# Patient Record
Sex: FEMALE | Race: WHITE | NOT HISPANIC OR LATINO | Employment: OTHER | URBAN - METROPOLITAN AREA
[De-identification: names, ages, dates, MRNs, and addresses within clinical notes are randomized per-mention and may not be internally consistent; named-entity substitution may affect disease eponyms.]

---

## 2023-03-08 ENCOUNTER — HOSPITAL ENCOUNTER (EMERGENCY)
Facility: HOSPITAL | Age: 73
Discharge: HOME | End: 2023-03-08
Attending: EMERGENCY MEDICINE
Payer: MEDICARE

## 2023-03-08 VITALS
OXYGEN SATURATION: 98 % | WEIGHT: 250 LBS | DIASTOLIC BLOOD PRESSURE: 81 MMHG | RESPIRATION RATE: 18 BRPM | HEIGHT: 63 IN | TEMPERATURE: 99 F | HEART RATE: 66 BPM | BODY MASS INDEX: 44.3 KG/M2 | SYSTOLIC BLOOD PRESSURE: 196 MMHG

## 2023-03-08 DIAGNOSIS — R10.32 LEFT LOWER QUADRANT ABDOMINAL PAIN: Primary | ICD-10-CM

## 2023-03-08 LAB
ALBUMIN SERPL BCP-MCNC: 3.8 G/DL (ref 3.5–5.2)
ALP SERPL-CCNC: 101 U/L (ref 55–135)
ALT SERPL W/O P-5'-P-CCNC: 22 U/L (ref 10–44)
ANION GAP SERPL CALC-SCNC: 10 MMOL/L (ref 8–16)
AST SERPL-CCNC: 25 U/L (ref 10–40)
BASOPHILS # BLD AUTO: 0.07 K/UL (ref 0–0.2)
BASOPHILS NFR BLD: 0.9 % (ref 0–1.9)
BILIRUB SERPL-MCNC: 0.5 MG/DL (ref 0.1–1)
BILIRUB UR QL STRIP: NEGATIVE
BUN SERPL-MCNC: 16 MG/DL (ref 8–23)
CALCIUM SERPL-MCNC: 9.8 MG/DL (ref 8.7–10.5)
CHLORIDE SERPL-SCNC: 102 MMOL/L (ref 95–110)
CLARITY UR REFRACT.AUTO: CLEAR
CO2 SERPL-SCNC: 25 MMOL/L (ref 23–29)
COLOR UR AUTO: YELLOW
CREAT SERPL-MCNC: 0.8 MG/DL (ref 0.5–1.4)
DIFFERENTIAL METHOD: ABNORMAL
EOSINOPHIL # BLD AUTO: 0.4 K/UL (ref 0–0.5)
EOSINOPHIL NFR BLD: 5.7 % (ref 0–8)
ERYTHROCYTE [DISTWIDTH] IN BLOOD BY AUTOMATED COUNT: 13 % (ref 11.5–14.5)
EST. GFR  (NO RACE VARIABLE): >60 ML/MIN/1.73 M^2
GLUCOSE SERPL-MCNC: 103 MG/DL (ref 70–110)
GLUCOSE UR QL STRIP: NEGATIVE
HCT VFR BLD AUTO: 44.4 % (ref 37–48.5)
HGB BLD-MCNC: 14 G/DL (ref 12–16)
HGB UR QL STRIP: NEGATIVE
IMM GRANULOCYTES # BLD AUTO: 0.01 K/UL (ref 0–0.04)
IMM GRANULOCYTES NFR BLD AUTO: 0.1 % (ref 0–0.5)
KETONES UR QL STRIP: NEGATIVE
LEUKOCYTE ESTERASE UR QL STRIP: NEGATIVE
LIPASE SERPL-CCNC: 10 U/L (ref 4–60)
LYMPHOCYTES # BLD AUTO: 2.1 K/UL (ref 1–4.8)
LYMPHOCYTES NFR BLD: 27.6 % (ref 18–48)
MCH RBC QN AUTO: 29 PG (ref 27–31)
MCHC RBC AUTO-ENTMCNC: 31.5 G/DL (ref 32–36)
MCV RBC AUTO: 92 FL (ref 82–98)
MONOCYTES # BLD AUTO: 0.7 K/UL (ref 0.3–1)
MONOCYTES NFR BLD: 9.1 % (ref 4–15)
NEUTROPHILS # BLD AUTO: 4.3 K/UL (ref 1.8–7.7)
NEUTROPHILS NFR BLD: 56.6 % (ref 38–73)
NITRITE UR QL STRIP: NEGATIVE
NRBC BLD-RTO: 0 /100 WBC
PH UR STRIP: 7 [PH] (ref 5–8)
PLATELET # BLD AUTO: 250 K/UL (ref 150–450)
PMV BLD AUTO: 9.3 FL (ref 9.2–12.9)
POTASSIUM SERPL-SCNC: 4.7 MMOL/L (ref 3.5–5.1)
PROT SERPL-MCNC: 7.4 G/DL (ref 6–8.4)
PROT UR QL STRIP: ABNORMAL
RBC # BLD AUTO: 4.82 M/UL (ref 4–5.4)
SODIUM SERPL-SCNC: 137 MMOL/L (ref 136–145)
SP GR UR STRIP: 1.02 (ref 1–1.03)
URN SPEC COLLECT METH UR: ABNORMAL
WBC # BLD AUTO: 7.57 K/UL (ref 3.9–12.7)

## 2023-03-08 PROCEDURE — 63600175 PHARM REV CODE 636 W HCPCS: Performed by: EMERGENCY MEDICINE

## 2023-03-08 PROCEDURE — 99285 EMERGENCY DEPT VISIT HI MDM: CPT | Mod: 25

## 2023-03-08 PROCEDURE — 99284 PR EMERGENCY DEPT VISIT,LEVEL IV: ICD-10-PCS | Mod: ,,, | Performed by: EMERGENCY MEDICINE

## 2023-03-08 PROCEDURE — 83690 ASSAY OF LIPASE: CPT | Performed by: EMERGENCY MEDICINE

## 2023-03-08 PROCEDURE — 25500020 PHARM REV CODE 255: Performed by: EMERGENCY MEDICINE

## 2023-03-08 PROCEDURE — 96374 THER/PROPH/DIAG INJ IV PUSH: CPT | Mod: 59

## 2023-03-08 PROCEDURE — 85025 COMPLETE CBC W/AUTO DIFF WBC: CPT | Performed by: EMERGENCY MEDICINE

## 2023-03-08 PROCEDURE — 81003 URINALYSIS AUTO W/O SCOPE: CPT | Performed by: EMERGENCY MEDICINE

## 2023-03-08 PROCEDURE — 99284 EMERGENCY DEPT VISIT MOD MDM: CPT | Mod: ,,, | Performed by: EMERGENCY MEDICINE

## 2023-03-08 PROCEDURE — 80053 COMPREHEN METABOLIC PANEL: CPT | Performed by: EMERGENCY MEDICINE

## 2023-03-08 RX ORDER — MORPHINE SULFATE 4 MG/ML
4 INJECTION, SOLUTION INTRAMUSCULAR; INTRAVENOUS
Status: COMPLETED | OUTPATIENT
Start: 2023-03-08 | End: 2023-03-08

## 2023-03-08 RX ADMIN — MORPHINE SULFATE 4 MG: 4 INJECTION INTRAVENOUS at 12:03

## 2023-03-08 RX ADMIN — IOHEXOL 100 ML: 350 INJECTION, SOLUTION INTRAVENOUS at 01:03

## 2023-03-08 NOTE — DISCHARGE INSTRUCTIONS
Your CT scan did not show an acute cause of your pain.  There were some enlarged lymph nodes in the middle of your stomach which could be from a recent infection.  It would be helpful if they can compare your CT scan from today from previous abdominal imaging.  return to emergency department if symptoms get worse.

## 2023-03-08 NOTE — ED PROVIDER NOTES
Encounter Date: 3/8/2023    SCRIBE #1 NOTE: I, Francy Chacon, am scribing for, and in the presence of,  Brando Oro MD. I have scribed the following portions of the note - Other sections scribed: HPI, ROS, PE.     History     Chief Complaint   Patient presents with    Abdominal Pain     Constant LLQ since 9pm last night without N/V/D/fevers. Pain pill with gabapentin this am- dulls pain slightly.      Time patient was seen by the provider: 11:19 AM      The patient is a 72 y.o. female with past medical history of cancer, depression (on Celexa), and neuropathy (on Gabapentin) who presents to the ED with a complaint of LLQ abdominal pain onset 9 pm last night. The patient reports that she spontaneously developed abdominal pain that she describes as a throbbing sensation that radiates to her left back. She took Hydrocodone and Gabapentin with improvement. The patient had symptoms like this before in the past, but notes that her symptoms resolved after placing a heating pad over the area. She used a heating pad yesterday with no relief. Her surgical history consist of , appendectomy, and tubal ligation. No history of diverticulitis. She denies fever, cough, SOB, chest pain, nausea, vomiting, diarrhea, dysuria, and vaginal bleeding. A ten point review of systems was completed and is negative except as documented above.  Patient denies any other acute medical complaint. The patients available PMH, PSH, Social History, medications, allergies, and triage vital signs were reviewed just prior to their medical evaluation.      The history is provided by the patient and medical records. No  was used.   Review of patient's allergies indicates:   Allergen Reactions    Penicillins      Past Medical History:   Diagnosis Date    Cancer      Past Surgical History:   Procedure Laterality Date    APPENDECTOMY       SECTION      JOINT REPLACEMENT      TUBAL LIGATION       No family history on  file.  Social History     Tobacco Use    Smoking status: Never    Smokeless tobacco: Never   Substance Use Topics    Alcohol use: Never     Review of Systems   Constitutional:  Negative for fever.   Respiratory:  Negative for cough and shortness of breath.    Cardiovascular:  Negative for chest pain.   Gastrointestinal:  Positive for abdominal pain (LLQ). Negative for diarrhea, nausea and vomiting.   Genitourinary:  Negative for dysuria and vaginal bleeding.     Physical Exam     Initial Vitals [03/08/23 1106]   BP Pulse Resp Temp SpO2   132/74 89 18 98.7 °F (37.1 °C) 95 %      MAP       --         Physical Exam    Nursing note and vitals reviewed.  Constitutional: She appears well-developed and well-nourished. She is not diaphoretic. No distress.   HENT:   Head: Normocephalic and atraumatic.   Nose: Nose normal.   Eyes: Conjunctivae are normal. Right eye exhibits no discharge. Left eye exhibits no discharge.   Neck: Neck supple.   Normal range of motion.  Cardiovascular:  Normal rate, regular rhythm and normal heart sounds.     Exam reveals no gallop and no friction rub.       No murmur heard.  Pulmonary/Chest: Breath sounds normal. No respiratory distress. She has no wheezes. She has no rhonchi. She has no rales.   Abdominal: Abdomen is soft. She exhibits no distension. There is abdominal tenderness.   Mild LLQ tenderness to palpation. There is no rebound and no guarding.   Musculoskeletal:         General: No tenderness or edema. Normal range of motion.      Cervical back: Normal range of motion and neck supple.     Neurological: She is alert and oriented to person, place, and time. GCS score is 15. GCS eye subscore is 4. GCS verbal subscore is 5. GCS motor subscore is 6.   Skin: Skin is warm and dry. No rash noted. No erythema.   Psychiatric: She has a normal mood and affect. Her behavior is normal. Judgment and thought content normal.       ED Course   Procedures  Labs Reviewed   CBC W/ AUTO DIFFERENTIAL -  Abnormal; Notable for the following components:       Result Value    MCHC 31.5 (*)     All other components within normal limits   URINALYSIS, REFLEX TO URINE CULTURE - Abnormal; Notable for the following components:    Protein, UA Trace (*)     All other components within normal limits    Narrative:     Specimen Source->Urine   COMPREHENSIVE METABOLIC PANEL   LIPASE          Imaging Results              US Pelvis Comp with Transvag NON-OB (xpd) (Final result)  Result time 23 17:03:16   Procedure changed from US Pelvis Limited Non OB     Final result by Jake Eaton MD (23 17:03:16)                   Impression:      No significant abnormality.    Electronically signed by resident: Hector Oneal  Date:    2023  Time:    16:52    Electronically signed by: Jake Eaton  Date:    2023  Time:    17:03               Narrative:    EXAMINATION:  US PELVIS COMP WITH TRANSVAG NON-OB (XPD)    CLINICAL HISTORY:  left adnexal pain;    TECHNIQUE:  Transabdominal sonography of the pelvis was performed, followed by transvaginal sonography to better evaluate the uterus and ovaries.    COMPARISON:  CT abdomen 2023    FINDINGS:  Uterus:    Size: 5.3 x 4.5 x 2.5 cm    Masses: Scarring in the anterior lower uterine segment from prior  section.    Endometrium: Normal in this post menopausal patient, measuring 1 mm.  Trace fluid in the endometrial cavity, likely secondary to cervical stenosis.    Right ovary: Not visualized.    Left ovary:    Size: 1.3 x 1.1 x 1.7 cm    Appearance: Normal    Vascular Flow: Normal.    Free Fluid:    None.                                        CT Abdomen Pelvis With Contrast (Final result)  Result time 23 14:31:08      Final result by Thierno Toledo MD (23 14:31:08)                   Impression:      1. No acute process or CT findings identified to explain patient's symptoms of left lower quadrant pain.  Specifically, there is minimal colonic  diverticulosis without diverticulitis, and no inflammatory process seen in the left lower quadrant or pelvis.  2. Mild hepatomegaly.  3. Cholelithiasis without acute cholecystitis.  4. Small hiatal hernia.  5. Central mesentery and twila hepatis nonspecific lymphadenopathy.  Differential considerations include sequela of recent or remote infectious or inflammatory process including occult enteritis versus density including lymphoma.  Clinical correlation and with any prior imaging from outside facility if/when available to assess for chronicity, is recommended.  Further evaluation/follow-up as warranted.  6. Atherosclerosis.  7. Few additional findings as above.  This report was flagged in Epic as abnormal.      Electronically signed by: Thierno Toledo MD  Date:    03/08/2023  Time:    14:31               Narrative:    EXAMINATION:  CT ABDOMEN PELVIS WITH CONTRAST    CLINICAL HISTORY:  LLQ abdominal pain;    TECHNIQUE:  Low dose axial images, sagittal and coronal reformations were obtained from the lung bases to the pubic symphysis following the IV administration of 100 mL of Omnipaque 350 .  Oral contrast was not given.    COMPARISON:  Chest radiograph 01/05/2017 and 03/12/2012    FINDINGS:  Imaged lung bases show minimal dependent atelectasis and minimal scattered platelike scarring versus atelectasis.  Base of the heart is upper limits of normal in size without significant pericardial fluid noting prominent mitral annular calcifications.    Liver measures 18.6 cm in length without focal process seen.    Cholelithiasis without acute cholecystitis.  Pancreas is mildly atrophic without focal process or adjacent inflammatory change.  Spleen, stomach, duodenum and bilateral adrenal glands are within normal limits.  No significant biliary ductal dilatation.    Small hiatal hernia.    Bilateral kidneys are normal in size, shape and location with symmetric normal enhancement.  No hydronephrosis or significant perinephric  stranding.  Ureters are nondilated.  Urinary bladder is within normal limits.  Uterus and bilateral adnexa are within normal limits.  Few scattered subcentimeter pelvic phleboliths noted.  No significant free fluid in the pelvis.    Appendix not identified; however, no pericecal inflammatory change.  Terminal ileum is within normal limits.  Moderate amount of stool noted throughout the colon and rectum.  Few scattered colonic diverticula without diverticulitis.  No evidence of bowel obstruction or acute inflammation.  No pneumatosis or portal venous gas.    No ascites or free air.    Several prominent and also mildly enlarged lymph nodes within the midline and left central mesentery of the mid abdomen, largest measuring up to 1.5 cm in maximum short axis.  Few prominent and also mildly enlarged twila hepatis lymph nodes measuring up to 1.1 cm in maximum short axis.  Several scattered subcentimeter retroperitoneal lymph nodes.  No retroperitoneal, pelvic or inguinal lymphadenopathy by CT criteria.    Minimal to mild scattered calcific atherosclerosis of the abdominal aorta extending into its mesenteric and iliac branches.  Abdominal aorta is slightly tortuous without aneurysm or dissection.    Small fat containing umbilical hernia.    Osseous structures show generalized osteopenia and age-related degenerative change most prominent at L2-3 and L4-5 levels without acute or destructive process seen.                                       Medications   morphine injection 4 mg (4 mg Intravenous Given 3/8/23 1210)   iohexoL (OMNIPAQUE 350) injection 100 mL (100 mLs Intravenous Given 3/8/23 1354)     Medical Decision Making:   History:   Old Medical Records: I decided to obtain old medical records.  Clinical Tests:   Lab Tests: Ordered and Reviewed  Radiological Study: Ordered and Reviewed  ED Management:  72-year-old female presents with left lower quadrant and left inguinal pain.  Vitals with hypertension.  Physical exam  as above.  Labs unremarkable.  CT without evidence of acute surgical pathology.  Ordered ultrasound.  Ultrasound pending at turnover.  Discussed with Dr. Branham who will f/up and dispo.  Did bedside teaching.  All questions answered.  Patient acknowledges understanding.         Scribe Attestation:   Scribe #1: I performed the above scribed service and the documentation accurately describes the services I performed. I attest to the accuracy of the note.                   Clinical Impression:   Final diagnoses:  [R10.32] Left lower quadrant abdominal pain (Primary)        ED Disposition Condition    Discharge Stable          ED Prescriptions    None       Follow-up Information       Follow up With Specialties Details Why Contact Info        Please follow-up with your PCP.             Brando Oro MD  03/10/23 0968

## 2023-03-08 NOTE — PROVIDER PROGRESS NOTES - EMERGENCY DEPT.
Encounter Date: 3/8/2023    ED Physician Progress Notes        Physician Note:   5:00 Pm  Patient received in sign-out from Dr. Welsh.  Briefly, patient is a 72-year-old female presenting today with left lower quadrant abdominal pain that started last night.  Vital signs are stable  Labs including UA were negative  CT abdomen pelvis showed no acute process, did show lymphadenopathy in the central abdomen without obvious source of infection.  Thought it could be secondary to resolving infection.    At time of sign-out, pending pelvic ultrasound    5:20 PM  Ultrasound negative for acute process.    Discussed results with patient at bedside.  It would be helpful if she could have her primary care doctor reviewed the images of the CT scan today compare with previous imaging regarding the lymphadenopathy.  She may need further workup as an outpatient I do not think this is the cause of her pain today.  I have given her strict return precautions if symptoms get worse, fever, vomiting or focal pain.    JAZMIN Branham MD  Staff ED Physician  03/08/2023 5:24 PM

## 2023-03-08 NOTE — ED TRIAGE NOTES
Patient identifiers for Elizabeth Au 72 y.o. female checked and correct.  Chief Complaint   Patient presents with    Abdominal Pain     Constant LLQ since 9pm last night without N/V/D/fevers. Pain pill with gabapentin this am- dulls pain slightly.      No past medical history on file.  Allergies reported: Review of patient's allergies indicates:  Not on File      LOC: Patient is awake, alert, and aware of environment with an appropriate affect. Patient is oriented x 4 and speaking appropriately.  APPEARANCE: Patient resting comfortably and in no acute distress. Patient is clean and well groomed, patient's clothing is properly fastened.  SKIN: The skin is warm and dry. Patient has normal skin turgor and moist mucus membranes.   MUSKULOSKELETAL: Patient is moving all extremities well, no obvious deformities noted. Pulses intact.   RESPIRATORY: Airway is open and patent. Respirations are spontaneous and non-labored with normal effort and rate.  CARDIAC: Patient has a normal rate and rhythm on cardiac monitor. No peripheral edema noted.   ABDOMEN: No distention noted. Soft and non-tender upon palpation. Right lower pain  NEUROLOGICAL:  PERRL. Facial expression is symmetrical. Hand grasps are equal bilaterally. Normal sensation in all extremities when touched with finger.

## 2023-09-08 ENCOUNTER — TELEPHONE (OUTPATIENT)
Dept: SURGERY | Facility: CLINIC | Age: 73
End: 2023-09-08
Payer: MEDICARE

## 2023-09-12 ENCOUNTER — TELEPHONE (OUTPATIENT)
Dept: BARIATRICS | Facility: CLINIC | Age: 73
End: 2023-09-12
Payer: MEDICARE

## 2023-09-12 NOTE — TELEPHONE ENCOUNTER
Patient on work queue list following financial appt.  Reviewed insurance and department guidelines- pt meets BMI requirement.  Dashboard updated with financial information.  Left clinic phone number and asked her to call us so we can assist in getting her appts. Scheduled.     PALOMO Vega RN

## 2023-09-19 ENCOUNTER — TELEPHONE (OUTPATIENT)
Dept: BARIATRICS | Facility: CLINIC | Age: 73
End: 2023-09-19
Payer: MEDICARE

## 2023-09-19 NOTE — TELEPHONE ENCOUNTER
Phoned pt in regard to scheduling consults for surgical wl. Gave pt detail instructions on hospital location.

## 2023-09-19 NOTE — TELEPHONE ENCOUNTER
Returned patient's call.  [facility] with contact info.     PALOMO Vega RN    ----- Message from Cyndie Vega RN sent at 9/18/2023  5:14 PM CDT -----  Contact: [phone]    ----- Message -----  From: Jin Baez  Sent: 9/13/2023   2:59 PM CDT  To: Cyndie Vega RN    Pt stating she is wanting to make an appt. Requesting a f/u call.

## 2023-09-19 NOTE — TELEPHONE ENCOUNTER
----- Message from Tomasz Olea sent at 9/19/2023 10:25 AM CDT -----  Regarding: Missed call  Contact: [phone]  Hi, pt missed a call from the office today and is asking for a call back at [phone] to help pt get scheduled.

## 2023-09-20 ENCOUNTER — HOSPITAL ENCOUNTER (OUTPATIENT)
Dept: CARDIOLOGY | Facility: CLINIC | Age: 73
Discharge: HOME | End: 2023-09-20
Payer: MEDICARE

## 2023-09-20 ENCOUNTER — HOSPITAL ENCOUNTER (OUTPATIENT)
Dept: RADIOLOGY | Facility: HOSPITAL | Age: 73
Discharge: HOME | End: 2023-09-20
Attending: NURSE PRACTITIONER
Payer: MEDICARE

## 2023-09-20 ENCOUNTER — CLINICAL SUPPORT (OUTPATIENT)
Dept: BARIATRICS | Facility: CLINIC | Age: 73
End: 2023-09-20
Payer: MEDICARE

## 2023-09-20 ENCOUNTER — OFFICE VISIT (OUTPATIENT)
Dept: BARIATRICS | Facility: CLINIC | Age: 73
End: 2023-09-20
Payer: MEDICARE

## 2023-09-20 VITALS — BODY MASS INDEX: 46.52 KG/M2 | WEIGHT: 262.56 LBS | HEIGHT: 63 IN

## 2023-09-20 VITALS
DIASTOLIC BLOOD PRESSURE: 65 MMHG | BODY MASS INDEX: 46.45 KG/M2 | WEIGHT: 262.13 LBS | OXYGEN SATURATION: 98 % | HEIGHT: 63 IN | HEART RATE: 75 BPM | SYSTOLIC BLOOD PRESSURE: 124 MMHG

## 2023-09-20 DIAGNOSIS — G62.0 CHEMOTHERAPY-INDUCED NEUROPATHY: ICD-10-CM

## 2023-09-20 DIAGNOSIS — I89.0 LYMPHEDEMA: ICD-10-CM

## 2023-09-20 DIAGNOSIS — E66.01 CLASS 3 SEVERE OBESITY WITH BODY MASS INDEX (BMI) OF 45.0 TO 49.9 IN ADULT, UNSPECIFIED OBESITY TYPE, UNSPECIFIED WHETHER SERIOUS COMORBIDITY PRESENT: Primary | ICD-10-CM

## 2023-09-20 DIAGNOSIS — E66.01 CLASS 3 SEVERE OBESITY WITH BODY MASS INDEX (BMI) OF 45.0 TO 49.9 IN ADULT, UNSPECIFIED OBESITY TYPE, UNSPECIFIED WHETHER SERIOUS COMORBIDITY PRESENT: ICD-10-CM

## 2023-09-20 DIAGNOSIS — M17.0 OSTEOARTHRITIS OF BOTH KNEES, UNSPECIFIED OSTEOARTHRITIS TYPE: ICD-10-CM

## 2023-09-20 DIAGNOSIS — J45.909 ASTHMA, UNSPECIFIED ASTHMA SEVERITY, UNSPECIFIED WHETHER COMPLICATED, UNSPECIFIED WHETHER PERSISTENT: ICD-10-CM

## 2023-09-20 DIAGNOSIS — T45.1X5A CHEMOTHERAPY-INDUCED NEUROPATHY: ICD-10-CM

## 2023-09-20 DIAGNOSIS — Z71.3 DIETARY COUNSELING AND SURVEILLANCE: Primary | ICD-10-CM

## 2023-09-20 DIAGNOSIS — E78.49 OTHER HYPERLIPIDEMIA: ICD-10-CM

## 2023-09-20 DIAGNOSIS — K21.9 GASTROESOPHAGEAL REFLUX DISEASE WITHOUT ESOPHAGITIS: ICD-10-CM

## 2023-09-20 DIAGNOSIS — E66.01 MORBID OBESITY WITH BMI OF 45.0-49.9, ADULT: ICD-10-CM

## 2023-09-20 DIAGNOSIS — Z86.39 HISTORY OF OBESITY IN ADULTHOOD: ICD-10-CM

## 2023-09-20 DIAGNOSIS — Z79.899 POLYPHARMACY: ICD-10-CM

## 2023-09-20 DIAGNOSIS — E03.9 HYPOTHYROIDISM, UNSPECIFIED TYPE: ICD-10-CM

## 2023-09-20 PROBLEM — M17.9 OA (OSTEOARTHRITIS) OF KNEE: Status: ACTIVE | Noted: 2023-09-20

## 2023-09-20 PROCEDURE — 99999 PR PBB SHADOW E&M-EST. PATIENT-LVL II: CPT | Mod: PBBFAC,,, | Performed by: DIETITIAN, REGISTERED

## 2023-09-20 PROCEDURE — 99205 OFFICE O/P NEW HI 60 MIN: CPT | Mod: S$GLB,,, | Performed by: NURSE PRACTITIONER

## 2023-09-20 PROCEDURE — 1101F PT FALLS ASSESS-DOCD LE1/YR: CPT | Mod: CPTII,S$GLB,, | Performed by: NURSE PRACTITIONER

## 2023-09-20 PROCEDURE — 93005 EKG 12-LEAD: ICD-10-PCS | Mod: S$GLB,,, | Performed by: NURSE PRACTITIONER

## 2023-09-20 PROCEDURE — 93005 ELECTROCARDIOGRAM TRACING: CPT | Mod: S$GLB,,, | Performed by: NURSE PRACTITIONER

## 2023-09-20 PROCEDURE — 1159F PR MEDICATION LIST DOCUMENTED IN MEDICAL RECORD: ICD-10-PCS | Mod: CPTII,S$GLB,, | Performed by: NURSE PRACTITIONER

## 2023-09-20 PROCEDURE — 1126F AMNT PAIN NOTED NONE PRSNT: CPT | Mod: CPTII,S$GLB,, | Performed by: NURSE PRACTITIONER

## 2023-09-20 PROCEDURE — 1101F PR PT FALLS ASSESS DOC 0-1 FALLS W/OUT INJ PAST YR: ICD-10-PCS | Mod: CPTII,S$GLB,, | Performed by: NURSE PRACTITIONER

## 2023-09-20 PROCEDURE — 3078F DIAST BP <80 MM HG: CPT | Mod: CPTII,S$GLB,, | Performed by: NURSE PRACTITIONER

## 2023-09-20 PROCEDURE — 3008F BODY MASS INDEX DOCD: CPT | Mod: CPTII,S$GLB,, | Performed by: NURSE PRACTITIONER

## 2023-09-20 PROCEDURE — 99205 PR OFFICE/OUTPT VISIT, NEW, LEVL V, 60-74 MIN: ICD-10-PCS | Mod: S$GLB,,, | Performed by: NURSE PRACTITIONER

## 2023-09-20 PROCEDURE — 71046 X-RAY EXAM CHEST 2 VIEWS: CPT | Mod: 26,,, | Performed by: RADIOLOGY

## 2023-09-20 PROCEDURE — 99499 NO LOS: ICD-10-PCS | Mod: S$GLB,,, | Performed by: DIETITIAN, REGISTERED

## 2023-09-20 PROCEDURE — 3288F FALL RISK ASSESSMENT DOCD: CPT | Mod: CPTII,S$GLB,, | Performed by: NURSE PRACTITIONER

## 2023-09-20 PROCEDURE — 99499 UNLISTED E&M SERVICE: CPT | Mod: S$GLB,,, | Performed by: DIETITIAN, REGISTERED

## 2023-09-20 PROCEDURE — 3288F PR FALLS RISK ASSESSMENT DOCUMENTED: ICD-10-PCS | Mod: CPTII,S$GLB,, | Performed by: NURSE PRACTITIONER

## 2023-09-20 PROCEDURE — 99999 PR PBB SHADOW E&M-EST. PATIENT-LVL II: ICD-10-PCS | Mod: PBBFAC,,, | Performed by: DIETITIAN, REGISTERED

## 2023-09-20 PROCEDURE — 3078F PR MOST RECENT DIASTOLIC BLOOD PRESSURE < 80 MM HG: ICD-10-PCS | Mod: CPTII,S$GLB,, | Performed by: NURSE PRACTITIONER

## 2023-09-20 PROCEDURE — 1159F MED LIST DOCD IN RCRD: CPT | Mod: CPTII,S$GLB,, | Performed by: NURSE PRACTITIONER

## 2023-09-20 PROCEDURE — 1126F PR PAIN SEVERITY QUANTIFIED, NO PAIN PRESENT: ICD-10-PCS | Mod: CPTII,S$GLB,, | Performed by: NURSE PRACTITIONER

## 2023-09-20 PROCEDURE — 71046 X-RAY EXAM CHEST 2 VIEWS: CPT | Mod: TC,FY

## 2023-09-20 PROCEDURE — 3074F SYST BP LT 130 MM HG: CPT | Mod: CPTII,S$GLB,, | Performed by: NURSE PRACTITIONER

## 2023-09-20 PROCEDURE — 99999 PR PBB SHADOW E&M-EST. PATIENT-LVL IV: ICD-10-PCS | Mod: PBBFAC,,, | Performed by: NURSE PRACTITIONER

## 2023-09-20 PROCEDURE — 71046 XR CHEST PA AND LATERAL: ICD-10-PCS | Mod: 26,,, | Performed by: RADIOLOGY

## 2023-09-20 PROCEDURE — 3008F PR BODY MASS INDEX (BMI) DOCUMENTED: ICD-10-PCS | Mod: CPTII,S$GLB,, | Performed by: NURSE PRACTITIONER

## 2023-09-20 PROCEDURE — 93010 ELECTROCARDIOGRAM REPORT: CPT | Mod: S$GLB,,, | Performed by: INTERNAL MEDICINE

## 2023-09-20 PROCEDURE — 93010 EKG 12-LEAD: ICD-10-PCS | Mod: S$GLB,,, | Performed by: INTERNAL MEDICINE

## 2023-09-20 PROCEDURE — 99999 PR PBB SHADOW E&M-EST. PATIENT-LVL IV: CPT | Mod: PBBFAC,,, | Performed by: NURSE PRACTITIONER

## 2023-09-20 PROCEDURE — 3074F PR MOST RECENT SYSTOLIC BLOOD PRESSURE < 130 MM HG: ICD-10-PCS | Mod: CPTII,S$GLB,, | Performed by: NURSE PRACTITIONER

## 2023-09-20 RX ORDER — ATORVASTATIN CALCIUM 10 MG/1
10 TABLET, FILM COATED ORAL
COMMUNITY
Start: 2023-09-07

## 2023-09-20 RX ORDER — FLUTICASONE FUROATE, UMECLIDINIUM BROMIDE AND VILANTEROL TRIFENATATE 200; 62.5; 25 UG/1; UG/1; UG/1
1 POWDER RESPIRATORY (INHALATION)
COMMUNITY
Start: 2023-08-31 | End: 2024-01-04 | Stop reason: SDUPTHER

## 2023-09-20 RX ORDER — DEXMETHYLPHENIDATE HYDROCHLORIDE 10 MG/1
10 TABLET ORAL 2 TIMES DAILY
COMMUNITY
Start: 2023-04-12 | End: 2023-10-19

## 2023-09-20 RX ORDER — GABAPENTIN 100 MG/1
CAPSULE ORAL
COMMUNITY
Start: 2023-08-18

## 2023-09-20 RX ORDER — ALBUTEROL SULFATE 90 UG/1
AEROSOL, METERED RESPIRATORY (INHALATION)
COMMUNITY
Start: 2023-01-05

## 2023-09-20 RX ORDER — HYDROXYZINE HYDROCHLORIDE 25 MG/1
25 TABLET, FILM COATED ORAL
COMMUNITY
Start: 2023-07-27 | End: 2023-09-20

## 2023-09-20 RX ORDER — LEVOTHYROXINE SODIUM 125 UG/1
125 TABLET ORAL
COMMUNITY
Start: 2023-08-18

## 2023-09-20 RX ORDER — HYDROCODONE BITARTRATE AND ACETAMINOPHEN 5; 325 MG/1; MG/1
1 TABLET ORAL 2 TIMES DAILY PRN
COMMUNITY
Start: 2023-09-13

## 2023-09-20 RX ORDER — CITALOPRAM 20 MG/1
20 TABLET, FILM COATED ORAL
COMMUNITY
Start: 2023-08-18

## 2023-09-20 RX ORDER — OMEPRAZOLE 20 MG/1
20 CAPSULE, DELAYED RELEASE ORAL DAILY
COMMUNITY
Start: 2023-05-08

## 2023-09-20 NOTE — PATIENT INSTRUCTIONS
Prior to surgery you will need to complete:  - Dietitian consult and follow up appointments as needed  - Labs  - Chest X-ray  - EKG  - Psychological evaluation, Please call psychiatry [phone] to schedule  - Stress test     In preparation for bariatric surgery, please complete the following:   Discuss your current medications with your primary care provider, remember your medications will need to be crushed, chewable, or in liquid form for the first 2-4 weeks after a gastric bypass or sleeve.  For a gastric band, your medications will need to be crushed indefinitely.    If you take a blood thinner such as: Coumadin (warfarin), Pradaxa (dabigatran), or Plavix (clopidogrel), you will need to speak with your prescribing provider on how or if this medication can be stopped before surgery.   If you take a medication for depression or anxiety, remember to discuss this with the psychologist or psychiatrist that you see.   If you take medication for arthritis on a daily basis that is considered a non-steroidal anti-inflammatory (NSAID), please discuss with your prescribing physician an alternative medication.  After having gastric bypass or gastric sleeve, this group of medications is not appropriate to take due to increased risk of bleeding stomach ulcers.      DEFINITIONS  Appointments: Pre-scheduled meetings or consultations with any physician, advanced practice provider, dietitian, or psychologist, and labs, imaging studies, sleep studies, etc.   Late cancellation: Cancelling an appointment 24-48 hours prior to scheduled time.  No-Show appointment:  is when   You do NOT arrive to your appointment at the time its scheduled.  You call to cancel or cancel via [facility] less than 24 hours in advance of your scheduled appointment  You show up 15 minutes AFTER your scheduled appointment time without any notification of being late.     POLICY  You are allowed up to 3 cancellations for appointments.   After 3  cancellations your case will be placed on hold for 2 months and after that time you can resume the program.   You are allowed only 1 no-show for an appointment.   You will be re-scheduled one time and if there is a 2nd no-show at any point, your case will be placed on hold for 3 months.  After 3 months you can resume the program.     Upon resuming the program after being placed on hold for either above mentioned reasons, if you have a late cancel or no show for any appointment, the bariatric team will review if youre an appropriate candidate for surgery at the monthly meeting.

## 2023-09-20 NOTE — PROGRESS NOTES
BARIATRIC NEW PATIENT EVALUATION    CHIEF COMPLAINT:   Morbid obesity, body mass index is 47.17 kg/m². and inability to lose weight.    HPI:  Elizabeth Au is a 73 y.o. morbidly obese female. Her current body mass index is 47.17 kg/m². She has multiple associated comorbidities including hyperlipidemia, GERD, and osteoarthritis.  She has struggled with excess weight since 1980s.  Her highest adult weight was 280 lbs at age 71, and her lowest adult weight was 118 lbs at age 20.  The patient has tried Weight Watchers, calorie counting, Keto diet, and hormone therapy .  The patient was most successful with hormone shots with a weight loss of 30 lbs.  Her current exercise includes  PT   2 times a week. She denies any history of eating disorder such as anorexia, bulimia, or taking laxatives for weight loss, and denies any addiction including illicit substances, alcohol, or gambling.  Patient states she has a good  support system.  She lives with .  She is retired.  She  denies a history of GERD.  The patient's goal is to move better.     ESS: Score of 0, reviewed 09/20/2023.  Does not need Sleep Study.    Pre op weight-262  IBW-132    Lower mid line incision for ex lap that resulted in appendectomy 1980s    CT abd/pelvis 3/8/23:   Impression:   1. No acute process or CT findings identified to explain patient's symptoms of left lower quadrant pain.  Specifically, there is minimal colonic diverticulosis without diverticulitis, and no inflammatory process seen in the left lower quadrant or pelvis.  2. Mild hepatomegaly.  3. Cholelithiasis without acute cholecystitis.  4. Small hiatal hernia.  5. Central mesentery and twila hepatis nonspecific lymphadenopathy.  Differential considerations include sequela of recent or remote infectious or inflammatory process including occult enteritis versus density including lymphoma.  Clinical correlation and with any prior imaging from outside facility if/when available to assess  for chronicity, is recommended.  Further evaluation/follow-up as warranted.  6. Atherosclerosis.      2021 ( [facility] [place]) FL Esophagram IMPRESSION:   1. Normal pharyngeal phase of deglutition.   2. Presbyesophagus.   3. Hiatal hernia with a mildly tight Schatzki B ring but the patient swallowed a barium tablet without difficulty.   4. During the study, the patient demonstrated gastroesophageal reflux up to the level of the upper thoracic esophagus.   5.  chest film demonstrates some nodular soft tissue fullness in the region of the right tracheobronchial angle.       PFTs ([facility] [place]) 3/2022  Conclusions:   Overall normal complete lung function testing, clinical correlation   advised    PAST MEDICAL HISTORY:  Past Medical History:   Diagnosis Date    Cancer        PAST SURGICAL HISTORY:  Past Surgical History:   Procedure Laterality Date    APPENDECTOMY       SECTION      JOINT REPLACEMENT      TUBAL LIGATION         FAMILY HISTORY:  History reviewed. No pertinent family history.     SOCIAL HISTORY:  Social History     Socioeconomic History    Marital status:    Tobacco Use    Smoking status: Never    Smokeless tobacco: Never   Substance and Sexual Activity    Alcohol use: Never       MEDICATIONS:    Current Outpatient Medications:     albuterol (PROVENTIL/VENTOLIN HFA) 90 mcg/actuation inhaler, , Disp: , Rfl:     atorvastatin (LIPITOR) 10 MG tablet, Take 10 mg by mouth., Disp: , Rfl:     citalopram (CELEXA) 20 MG tablet, Take 20 mg by mouth., Disp: , Rfl:     FOCALIN 10 mg tablet, Take 10 mg by mouth 2 (two) times daily., Disp: , Rfl:     gabapentin (NEURONTIN) 100 MG capsule, Take by mouth., Disp: , Rfl:     HYDROcodone-acetaminophen (NORCO) 5-325 mg per tablet, Take 1 tablet by mouth 2 (two) times daily as needed., Disp: , Rfl:     levothyroxine (SYNTHROID) 125 MCG tablet, Take 125 mcg by mouth., Disp: , Rfl:     omeprazole (PRILOSEC) 20 MG capsule, Take 20 mg by mouth., Disp: , Rfl:     TRELEGY ELLIPTA  "200-62.5-25 mcg inhaler, Inhale 1 puff into the lungs., Disp: , Rfl:     ALLERGIES:  Review of patient's allergies indicates:   Allergen Reactions    Penicillins        Review of Systems   Constitutional:  Negative for chills and fever.   HENT:  Negative for ear pain, nosebleeds and sore throat.    Eyes:  Negative for blurred vision and double vision.   Respiratory:  Negative for cough and shortness of breath.    Cardiovascular:  Negative for chest pain, palpitations, orthopnea, claudication and leg swelling.   Gastrointestinal:  Negative for abdominal pain, constipation, diarrhea, heartburn, nausea and vomiting.   Genitourinary:  Negative for dysuria and urgency.   Musculoskeletal:  Negative for back pain and joint pain.   Skin:  Negative for rash.   Neurological:  Negative for dizziness, tingling, focal weakness and headaches.   Endo/Heme/Allergies:  Does not bruise/bleed easily.   Psychiatric/Behavioral:  Negative for depression and suicidal ideas.        Vitals:    09/20/23 1156   BP: 124/65   Pulse: 75   SpO2: 98%   Weight: 118.9 kg (262 lb 1.6 oz)   Height: 5' 2.5" (1.588 m)   PainSc: 0-No pain       Physical Exam  Vitals and nursing note reviewed.   Constitutional:       Appearance: She is well-developed. She is morbidly obese.      Comments: Arrived using a walker   HENT:      Head: Normocephalic.      Nose: Nose normal.      Mouth/Throat:      Mouth: Mucous membranes are moist.   Eyes:      Extraocular Movements: Extraocular movements intact.   Cardiovascular:      Rate and Rhythm: Normal rate and regular rhythm.      Heart sounds: Normal heart sounds.   Pulmonary:      Effort: Pulmonary effort is normal.      Breath sounds: Normal breath sounds.   Abdominal:      General: Bowel sounds are normal.      Palpations: Abdomen is soft.   Musculoskeletal:         General: Normal range of motion.      Cervical back: Normal range of motion.   Skin:     General: Skin is warm and dry.      Capillary Refill: Capillary " refill takes less than 2 seconds.   Neurological:      Mental Status: She is alert and oriented to person, place, and time.   Psychiatric:         Mood and Affect: Mood normal.        DIAGNOSIS:  1. Morbid obesity, body mass index is 47.17 kg/m². and inability to lose weight.  2. Co-morbidities: hyperlipidemia, GERD, and osteoarthritis    PLAN:  The patient is a good candidate for Bariatric Surgery. The patient is interested in laparoscopic sleeve gastrectomy with Dr. Corrales. The surgery and post-op care was discussed in detail with the patient. All questions were answered.    The patient understands that bariatric surgery is a tool to aid in weight loss and that they need to be committed to the diet and exercise post-operatively for successful weight loss. Discussed with patient that bariatric surgery is not the easy way out and that it will take plenty of dedication on the patient's part to be successful. Also discussed the possibility of weight regain if the patient strays from the diet guidelines or exercise requirements. Patient verbalized understanding and wishes to proceed with the work-up.    Estimated Goal weight is 195-200 lbs.    Discussed no NSAIDS post op    ORDERS:  1. Stress Test, Chest X-Ray, and EKG  2. Psychological Consult and Bariatric Dietician Consult  3. Bariatric Labs: Per orders.    PCP: No, Primary Doctor  RTC: As scheduled.    This includes 60 min face to face time and non-face to face time preparing to see the patient (eg, review of tests), obtaining and/or reviewing separately obtained history, documenting clinical information in the electronic or other health record, independently interpreting results and communicating results to the patient/family/caregiver, or care coordinator.

## 2023-09-20 NOTE — PATIENT INSTRUCTIONS
It was good getting to speak with you today.  I am including the following handouts.  Preparing for Bariatric Surgery  Bariatric Grocery List  0695-9041 calories with  gms of protein sample meal plan  Healthy Eating on the Go   Please let me know if you have any questions or concerns.  Prabha magaña,  Neema POLLOCK  Bariatric Dietitian  [phone]  Preparing for Bariatric Surgery: Nutrition    Bariatric surgery is a great tool in helping you lose weight. However, we need your help to make your surgery successful by following the nutrition plans before and after surgery. You will meet with a dietitian who will go over pre and post-surgery nutrition plans and will work with you to change your nutrition lifestyle. After your decision to have bariatric surgery, we ask that you start a high-protein/low- fat & low carbohydrate diet.     Below is a guideline to get you started.   All meals should only include lean meats/proteins and non-starchy vegetables. Fruits can be used as snacks or desserts. Try to avoid high sugar canned fruit (most canned fruit in syrup).   Eliminate empty calorie snacks (cake, candy, chips) and eat high-protein snacks instead (cheese sticks, rolled deli meat, cottage cheese and tomatoes)     Do not include any additional foods to your meal, such as: Breads or starchy vegetables (ex. corn, potatoes, sweet potatoes, green peas) Exercise at least 3 times a week for 30 minutes, it does not have to be 30 minutes all at once!   Switch to smaller plates to help you with portion control.  Begin limiting carbonated beverages   You may begin substituting one meal for a protein shake. Limit Caffeine        All liquids should be sugar-free and low calorie.  No Juices Practice taking small bites and sips. Practice not drinking while eating.     Meal Base Options  Flavor your food with lemon, vinegar, herbs and spices for big flavor without added calories.     Lean Meats/Proteins: How to prepare: bake, broil,  boil, roast, grill, sauté in Estefani or I can't believe it's not butter spray. Remove all visible fat before and after cooking.  NO BREADING or FRYING.    Poultry: skinless chicken or turkey (light/dark), ground (90% lean). Take off skin from poultry.  Fish/Shellfish: catfish, clams, crab, crawfish, lobster, salmon, shrimp, squid, tilapia, trout, tuna  Beef: tenderloin, roast (rib, charli, rump), steak (sirloin, round, cubed, T-bone, flank), ground (90% lean)  Pork: lean ham, Macedonian penaloza, tenderloin, center loin chop  Game: venison, rabbit, duck  Deli meats: turkey, roast beef, ham, chicken, low fat hot dogs  Dairy: low fat or fat free (3gm fat per ounce), sliced or shredded cheese, string cheese, hard cheese, cottage cheese, Greek or low-fat yogurt (aim for less than 10g sugar)  Soy: tofu  Eggs: However you like them!  Beans and Legumes: red, white, black, lima, black-eyed-peas, chickpeas, lentils, edamame  Nuts and Seeds: unsalted, ¼ cup    Non-Starchy Vegetables: How to prepare: boil, bake, steam, roast, microwave, grill, sauté in cooking spray. Do not add cream or cheese sauce.    Broccoli Cauliflower Carrots Onions Cabbage Radishes   Zucchini Okra Greens Peppers Spinach Turnips   Mushrooms Tomatoes Celery Lettuce Asparagus Eggplant   Green Onions  Kale  Green Beans Artichoke Squash  Cucumber Beets Brussel    Leeks Lettuce  Snow peas Sprouts     How To Choose A Protein Shake: Check label for no more than 4gm of total sugar.     Premade options:  Premier Protein and Premier Clear Muscle Milk    EAS AdvantEdge Carb Control Protein 2.0   Atkins Advantage Shake Pure Protein Shake   Slim fast: High Protein Cytosport Whey Isolate RTD   Lean Body On-the-Go  Total Lean from [facility]                                                      Zero Carb Isopure  [employer] Core Power     Adding sugar-free flavor extracts and syrups can help add variety to your shakes.  You may create your own protein shake with protein powder; just  make sure it fits into the rules.    How to Choose Fluids: All fluids before and after surgery should be sugar free, non-carbonated and low calorie (less than 15 calories per serving).    Options:  Plain Water (or Infused with lemon/lime/orange, berries, mint leaves, cucumber slices)  Flavored hartley - Propel Zero, Nestle Pure Life Splash, Aquafina Flavor Splash, Hint Water        Coffee or tea - (limit to one caffeinated drink per day) [place] Diet Green Tea, Diet Snapple Tea, Mary diet green tea  Sugar free (SF) flavorings/Water enhancers - Crystal Light, [place], Wyler's Light, SF John-aid, SF Hawaiian Punch, Dasani Drops, Great Value/Market Pantry SF drink mix  Diet lemonade  Powerade or Gatorade Zero   Fuze Slenderize (Low carb)  Diet ocean spray cranberry juices or Diet V-8 Splash  SF Jello and SF popsicles  Low sodium broth  Aim to drink a minimum of 64 oz fluid per day!        Bariatric Nutrition Core Points and Contract Agreement  AVOID THESE FOODS   High in Fat/Sugar:   High fat milk (whole, 2%)  Butter, margarine, oil instead use Estefani sprays or I Can't Believe It's Not Butter Spray   Mayonnaise, sour cream, cream cheese, salad dressing (may use low fat versions of these items)  Ice Cream  Cakes, cookies, pies, desserts  Candy  Luncheon meats (bologna, salami, chopped ham)  Sausage, Christine  Gravy  Breaded and Fried Foods  Sugary drinks  Alcohol     Starchy Carbohydrates.    White and wheat Bread, muffins, bagels, English muffins, biscuits, buns, rolls, cornbread,   Rice, Pasta   Cereals (including grits, oatmeal)   Crackers, Pretzels, Chips, Granola  Corn, Popcorn, Peas, Quinoa  White Potatoes, Sweet potatoes  Flour and corn tortillas   Practice NOT DRINKING   Carbonated drinks  Using a straw     Eat protein-source for breakfast (i.e. eggs, low-fat cottage cheese, Greek yogurt, sliced deli turkey, low-fat sliced cheese,   protein drinks or bars with 4 gms of sugar or less)    Limit starchy carbohydrates  (bread, rice, pasta, potatoes, corn, grits, oatmeal, etc)    Plan to eat 4-6 small meals per day. Protein drinks should be used for 1-2 of the small meals.    Measure portion sizes. Small plates, bowls and cups make smaller portions look bigger.    Limit eating out; make better choices when eating out (low fat/low carb)    Include fruits and vegetables in the diet DAILY    Avoid/Limit Desserts/candy     Low-fat diet (Baked, broiled, grilled, and boiled instead of fried, sautéed, creamed)    Increase activity (walking, swimming, exercise videos)    Limit sugary, caffeinated and carbonated beverages    Aim for 64 oz. water per day    Limit alcohol    Practice chewing foods thoroughly.    Practice sipping beverages--no chugging or gulping.  No Straws.    NO LIQUIDS 30 MIN. BEFORE, DURING, AND 30 MIN. AFTER MEALS.    Keep food logs and bring to each visit for review.  Can download anya named oNoise from smart phone or device.  Enter [facility] Code: 92288    I have been educated on the above lifestyle and nutrition changes regarding weight loss surgery.  I understand and agree that following these guidelines will help me to lose weight and maintain my health long-term.    Patient Signature ______________________________________   Date ____________________    Dietitian Signature ______________________________________           Greta Woodson, RD, LDN  Neema Troy, MS, RDN, LDN, CSOWM [facility] [address] [place] [address] PHONE: [phone] FAX: [phone]    Bariatric Diet Grocery List      High in Protein:   Canned tuna or chicken (packed in water)  Lean ground turkey breast or ground round  Turkey or chicken (no skin)  Lean pork or beef   Scrambled, poached, or boiled eggs  Baked or broiled fish and seafood (not fried!)  Beans, edamame and lentils  Low fat deli meats: turkey, chicken, ham, roast beef  1% or Skim Milk,  Lactaid, or Soymilk  Low-fat cheese, cottage cheese, mozzarella string cheese, ricotta  Light yogurt, FF/SF frozen yogurt, custard, SF pudding  Protein drinks and protein bars with 0-4 grams sugar     Fruits, Vegetables and Snacks   Green beans, broccoli, cauliflower, spinach, asparagus, carrots, lima beans, yellow squash, zucchini, etc.  Apple, pineapple, peach, grapes, banana, watermelon, oranges, etc.  Fruit canned in its own juice or in water (not in syrup)  Raw veggies  Lettuce: dark greens like spinach and Wes  Unsalted Nuts  Nader Links beef jerky     Fluids:   Skim/1% milk, Lactaid, Soymilk  Sugar-free beverages  (decaf and non-carbonated)  Decaf coffee & decaf tea   Water      1200- 1500 calorie Sample meal plan   80-120g protein per day.   Protein drinks and bars: 0-4 grams sugar   Drink lots of water throughout the day and exercise!   MENU # 1   Breakfast: 2 eggs, 1 turkey sausage mohan, 1 apple   Snack: P3 protein pack  Lunch: 2 roll-ups (sliced turkey, low-fat sliced cheese, mustard), 12 baby carrots dipped in 1 Tbsp natural peanut butter   Mid-Day Snack: ¼ cup unsalted almonds, ½ cup fruit   Dinner: 1 chicken thigh simmered in tomato sauce + 2 Tbsp mozzarella cheese, ½ cup black beans, 1/2 cup steamed carrots   Evening Snack: 1/2 cup grapes with 4 cubes low-fat cheddar cheese   ___________________________________________________   MENU # 2   Breakfast: 200 calorie protein drink   Mid-morning snack : ¼ cup unsalted nuts, medium banana   Lunch: 3oz tuna or chicken salad made with 2 tbsp light blount, over salad with tomatoes and cucumbers.   Snack: low-fat cheese stick   Dinner: 3oz hamburger mohan, slice of low-fat cheese, 1 cup yellow squash and zucchini sauteed in nonstick cookspray  Snack: 6oz light yogurt   _______________________________________________________   Menu #3   Breakfast: 6oz plain Greek yogurt + fruit (½ banana OR ½ cup fruit - pineapple, blueberries, strawberries, peach), may add  Splenda to javad.   Lunch: Grilled chicken breast w/ slice low-fat pepper elicia cheese, 1/2 cup grilled/baked asparagus and small salad with Salad Spritzer.   Mid-Day snack: 200 calorie protein drink   Dinner: 4oz Grilled fish, ½ cup white beans, ½ cup cooked spinach   Evening Snack: fudgsicle no-sugar-added   Menu # 4   Breakfast: 1 scoop vanilla protein powder + 4oz skim milk + 4oz coffee   Snack: Pure protein bar   Lunch: 2 Lettuce tacos: 3oz seasoned ground turkey wrapped in a Wes lettuce leaves with 1 Tbsp shredded cheese and dollop low-fat sour cream   Snack: ½ cup cottage cheese, ½ cup pineapple chunks   Dinner: Shrimp omelet: 2 eggs, ½ cup shrimp, green onions, and shredded cheese   ______________________________________________________   Menu #5   Breakfast: 1 cup low-fat cottage cheese, ½ cup peaches (no sugar added)   Snack: 1 apple, 4 cubes cheese   Lunch: 3oz baked pork chop, 1 cup okra and tomato stew   Snack: 1/2 cup black beans + salsa + dollop light sour cream   Dinner: Caprese chicken salad: 3 oz chicken breast, 1oz fresh mozzarella, sliced tomato, 1 Tbsp olive oil, basil   Snack: sugar-free popsicle   _______________________________________________________  Menu #6   Breakfast: ½ cup part-skim ricotta cheese, 2 Tbsp sugar-free strawberry preserves, sprinkle of slivered almonds   Snack: 1 orange + string cheese  Lunch: 3 oz canned chicken, 1oz shredded cheddar cheese, ½ cup black beans, 2 Tbsp salsa   Snack: 200 calorie Protein drink   Dinner: 4 oz grilled salmon steak, over mixed green salad with 2 tbsp light dressing   _______________________________________________________  Menu #7  Breakfast: crust-less quiche (bake 1 egg mixed w/ low fat cheese, broccoli and low sodium ham in a muffin cup until cooked inside)  Snack: 1 light yogurt  Lunch: protein shake (1 scoop powder + 8 oz skim milk, blended w/ ice)  Snack: small apple w/ 2 tbsp PB2 (powdered peanut butter)  Dinner: 2 Turkey meatballs  w/ low sugar marinara sauce, 1/2 cup spiralized zucchini (sauteed on stove top w/ nonstick cookspray)   Healthy Eating on the go ~ Pre and Post-Surgery     (*) Means this meal is appropriate for pre-surgery consumption because it is >30g of protein per meal. Post-surgery, may want to split meal in half or save a small portion for a snack.     Alisons Kitchen  Item  Calories  Protein (g)   Mediterranean Lamb Kafta  350 22   *Chicken Kabobs 290 41   *Davison Kabobs 330 40   Shrimp Kabobs 170 23   *Steak Kabobs 490 42   *Cauliflower Rice Bowl- chicken (salmon, lamb)  490 41   Mediterranean Chicken 260 34   *Protein Power Plate 520 41   Side items: Greek side salad, fruit salad, roasted vegetables, baked falafel       Vaughans   Item  Calories Protein (g)   Artisan Grilled Chicken Avilla, no bun  <380 36   *Christine Ranch Grilled Chicken Salad, no dressing <320 42   Double Hamburger, no bun <440 25   Side items: apple slices, side salad       Savanas   Item Calories Protein (g)   Grilled Chicken Avilla, no bun  <370 34   Wendover Chicken Salad, half size, no dressing 320  22   Apple Pecan Chicken Salad, half size, no dressing  340 20   Large Chili 250 21   Side items: garden or caesar side salad (no croutons), apple bites       Burger Teo   Item Calories Protein (g)   Grilled Chicken Avilla, no bun <470 37   Whopper Jr., no bun <310 13   Double Hamburger, no bun  <390 23   *Chicken Garden Salad, no croutons, use ½ packet of dressing  <520 40   Side items: garden side salad,         Chick-francisco-A  Item Calories Protein (g)   Grilled Chicken Avilla, no bun  <310 29   Grilled Nuggets, 8 count 140 25   Grilled Chicken Market Salad with light balsamic dressing 330 28   Small Chicken Tortilla Soup, no tortilla strips 340 23   Side items: side salad, superfood side salad, carrot raisin salad, fruit cup,          Sonic  Item Calories Protein (g)   JrJacoby Rodríguez, no bun  <330 15   Classic Grilled Chicken Avilla, no bun <490 31    Hearty Chili, no fritos 140 10       Rons   Item Calories Protein (g)   Blackened Chicken Tenders, 3 piece 170 26   Side items: green beans             Osmin Holt   Item Calories Protein (g)   Unwich: any sandwich made wrapped in lettuce instead of bread. Ask for no blount.      Original Roast Beef Unwich 260 16   Preston Breast Unwich 230 14   Perfect Kiswahili Unwich 340 22   Ham and Provolone Unwich 350 19   Tuna Salad Unwich 280 11   The Veggie Unwich 410 17   Side items: dill pickle          Chipotle  Item Calories Protein (g)   *Salad with your choice of meat, beans, fresh tomato salsa, fajita veggies, and guacamole (NO rice) ~375 ~40        Applebees  Item Calories Protein (g)   Grilled Chicken Breast 290 38   Uzbek Shrimp Salad, no wonton strips, use ½ dressing <390 26   Blackened Shrimp Caesar Salad, no croutons, use ½ dressing  <660 25   *Grilled Chicken Caesar Salad, no croutons, use ½ dressing <770 47   Gladwin Latonia with Maple Mustard Glaze 350 37   Side Grilled Shrimp Skewer 110 27   Side items: steamed broccoli, garlicky green beans,               [employer]OP  Item Calories Protein (g)   *Spinach & Mushroom Omelette, no hollandaise sauce  <890 46   *Garden Omelette 840 46   Egg White Vegetable Omelette 380 29   *Grilled Chicken & Veggie Salad, use ½ dressing  <680 38     Olive Garden   Item Calories Protein (g)   *Herb-Grilled Latonia 460 45   House salad with, no croutons. (Add grilled chicken or shrimp) <400 25   Side items: steamed broccoli       Walk Ons   Tuna Tini 410 30   Venison Conklin- Bowl 330 14   Chicken Berry Pecan Salad      Side items: seasonal fruit, broccoli, green beans side salad

## 2023-09-20 NOTE — PROGRESS NOTES
NUTRITIONAL CONSULT    Referring Physician: Quinn Corrales M.D.   Reason for MNT Referral: Initial assessment for sleeve gastrectomy work-up    PAST MEDICAL HISTORY:   73 y.o. female  Body mass index is 47.26 kg/m²..  Weight history includes lowest wt 118 lb at 20 years old.  Highest wt 280 lb 1.5 yrs ago.  Dieting attempts include Juliana Jake, WW, Keto, HCG shots .    Past Medical History:   Diagnosis Date    Cancer        CLINICAL DATA:  73 y.o.-year-old White female.  Height:   Weight: 262 lbs  IBW: 132 lbs  BMI: 47.26  The patient's goal weight (50% EBW): 198 lbs  Personal goal weight: 160 lbs    Goal for Bariatric Surgery: to improve health had cancer, more active and knee replacement    DAILY NUTRITIONAL NEEDS: pre-op nutritional bariatric guidelines to promote weight loss  6908-5504 Calories    Grams Protein    NUTRITION & HEALTH HISTORY:  Greatest challenge: starchy CHO    Current diet recall: 2-3 meals per day  B: cheerios banana and coffee skinny syrups  S: granola bar at times  L 45 calorie bread with turkey breast or leftovers from eating out  D: frozen entrees - arcelia callendars or lean cuisine  S: sometimes granola bar      Current Diet:  Meal pattern:   Protein supplements: 0-1- premier protein use as a lunch  Snacking: varies 1-2 / day  Vegetables: Likes a variety. Eats almost daily.  Fruits: Likes a variety. Eats daily.watermelon   Beverages: water, soda, diet tea, coffee without sugar, and whole milk in coffee  Dining out: Weekly. Every Sunday Mostly fast food and restaurants.  Cooking at home: Weekly. Varies from daily until 1 day week Mostly baked and grilled air fryer, crock potmeat, fish, starchy CHO, and vegetables.    Exercise:  Past exercise: Fair    Current exercise: Adequate  Pt pool therapytwice a week for an hour and membership fitness membership  Restrictions to exercise: knee pain    Vitamins / Minerals / Herbs:   2400 calcium  3000 Vit D3  Melantonin  Labs:    Reviewed.    Food Allergies:   MSG reaction    Social:  Retired.  Lives with  and cat.  Grocery shopping and food prep .  Patient believes the household will be supportive after surgery.  Alcohol: None. Sip on occassion  Smoking: None.    ASSESSMENT:  Patient reports attempts at weight loss, only to regain lost weight.  Patient demonstrated knowledge of healthy eating behaviors and exercise patterns; admits to not eating healthy and not exercising at this point.  Patient states willingness to change lifestyle and make behavior modifications .        Barriers to Education: none    Stage of change: determination and action    NUTRITION DIAGNOSIS:    Morbid Obesity related to Excessive calorie intake and Inadequate protein intake as evidence by BMI.    BARIATRIC DIET DISCUSSION/PLAN:  Discussed diet after surgery and related to patient's food record.  Reviewed nutrition guidelines for before and after surgery.  Answered all questions.  Work on Bariatric Nutrition Checklist.  Work on expanding variety of vegetables.  Work on gradually cutting back on starchy CHO in the diet.  1200-calorie diet.  1500-calorie diet.  5-6 meals per day.  Start including protein supplements in the diet plan daily.  Reduce frequency of dining out.  More grocery shopping and meal preparation at home.  Increase exercise.  Return to clinic.    RECOMMENDATIONS:  Pt is a potential candidate for bariatric surgery.    Follow up in one month.  Needs additional visits with RD.    Patient verbalized understanding.      Communicated nutrition plan with bariatric team.    SESSION TIME:  60 minutes

## 2023-09-26 ENCOUNTER — TELEPHONE (OUTPATIENT)
Dept: BARIATRICS | Facility: CLINIC | Age: 73
End: 2023-09-26
Payer: MEDICARE

## 2023-09-26 NOTE — TELEPHONE ENCOUNTER
Contacted the patient. Patient was informed that [facility] does not prescribe the medication Ozempic. Patient was advised that she needs to reach to PCP for medication. Patient verbalized understanding and call was disconnected.

## 2023-09-26 NOTE — TELEPHONE ENCOUNTER
----- Message from Evangelina Shi sent at 9/26/2023  4:28 PM CDT -----  Regarding: Prescription  Contact: [phone]  Calling to request prescription for Rx Ozempic sent pharmacy. Please call to confirm prescription as soon as possible with patient..    [facility] - [address]  [address] [place] [address]  Phone: [phone] Fax: [phone]

## 2023-10-02 ENCOUNTER — PATIENT MESSAGE (OUTPATIENT)
Dept: BARIATRICS | Facility: CLINIC | Age: 73
End: 2023-10-02
Payer: MEDICARE

## 2023-10-04 ENCOUNTER — PATIENT MESSAGE (OUTPATIENT)
Dept: BARIATRICS | Facility: CLINIC | Age: 73
End: 2023-10-04
Payer: MEDICARE

## 2023-10-10 ENCOUNTER — PATIENT MESSAGE (OUTPATIENT)
Dept: BARIATRICS | Facility: CLINIC | Age: 73
End: 2023-10-10
Payer: MEDICARE

## 2023-10-11 ENCOUNTER — HOSPITAL ENCOUNTER (OUTPATIENT)
Dept: CARDIOLOGY | Facility: HOSPITAL | Age: 73
Discharge: HOME | End: 2023-10-11
Attending: NURSE PRACTITIONER
Payer: MEDICARE

## 2023-10-11 VITALS
RESPIRATION RATE: 16 BRPM | DIASTOLIC BLOOD PRESSURE: 76 MMHG | SYSTOLIC BLOOD PRESSURE: 124 MMHG | BODY MASS INDEX: 46.38 KG/M2 | HEART RATE: 76 BPM | WEIGHT: 252 LBS | HEIGHT: 62 IN

## 2023-10-11 DIAGNOSIS — I89.0 LYMPHEDEMA: ICD-10-CM

## 2023-10-11 DIAGNOSIS — Z79.899 POLYPHARMACY: ICD-10-CM

## 2023-10-11 DIAGNOSIS — M17.0 OSTEOARTHRITIS OF BOTH KNEES, UNSPECIFIED OSTEOARTHRITIS TYPE: ICD-10-CM

## 2023-10-11 DIAGNOSIS — J45.909 ASTHMA, UNSPECIFIED ASTHMA SEVERITY, UNSPECIFIED WHETHER COMPLICATED, UNSPECIFIED WHETHER PERSISTENT: ICD-10-CM

## 2023-10-11 DIAGNOSIS — E66.01 CLASS 3 SEVERE OBESITY WITH BODY MASS INDEX (BMI) OF 45.0 TO 49.9 IN ADULT, UNSPECIFIED OBESITY TYPE, UNSPECIFIED WHETHER SERIOUS COMORBIDITY PRESENT: ICD-10-CM

## 2023-10-11 DIAGNOSIS — Z86.39 HISTORY OF OBESITY IN ADULTHOOD: ICD-10-CM

## 2023-10-11 DIAGNOSIS — T45.1X5A CHEMOTHERAPY-INDUCED NEUROPATHY: ICD-10-CM

## 2023-10-11 DIAGNOSIS — K21.9 GASTROESOPHAGEAL REFLUX DISEASE WITHOUT ESOPHAGITIS: ICD-10-CM

## 2023-10-11 DIAGNOSIS — G62.0 CHEMOTHERAPY-INDUCED NEUROPATHY: ICD-10-CM

## 2023-10-11 DIAGNOSIS — E78.49 OTHER HYPERLIPIDEMIA: ICD-10-CM

## 2023-10-11 DIAGNOSIS — E03.9 HYPOTHYROIDISM, UNSPECIFIED TYPE: ICD-10-CM

## 2023-10-11 LAB
ASCENDING AORTA: 2.46 CM
AV INDEX (PROSTH): 0.58
AV MEAN GRADIENT: 6 MMHG
AV PEAK GRADIENT: 12 MMHG
AV VALVE AREA BY VELOCITY RATIO: 2.08 CM²
AV VALVE AREA: 1.95 CM²
AV VELOCITY RATIO: 0.62
BSA FOR ECHO PROCEDURE: 2.24 M2
CV ECHO LV RWT: 0.33 CM
CV STRESS BASE HR: 64 BPM
DIASTOLIC BLOOD PRESSURE: 68 MMHG
DOP CALC AO PEAK VEL: 1.73 M/S
DOP CALC AO VTI: 40.52 CM
DOP CALC LVOT AREA: 3.4 CM2
DOP CALC LVOT DIAMETER: 2.07 CM
DOP CALC LVOT PEAK VEL: 1.07 M/S
DOP CALC LVOT STROKE VOLUME: 79.21 CM3
DOP CALC MV VTI: 47.5 CM
DOP CALCLVOT PEAK VEL VTI: 23.55 CM
E WAVE DECELERATION TIME: 260.76 MSEC
E/A RATIO: 1.02
E/E' RATIO: 23.8 M/S
ECHO LV POSTERIOR WALL: 0.82 CM (ref 0.6–1.1)
FRACTIONAL SHORTENING: 33 % (ref 28–44)
INTERVENTRICULAR SEPTUM: 0.81 CM (ref 0.6–1.1)
IVRT: 68.51 MSEC
LA MAJOR: 5.15 CM
LA MINOR: 5.45 CM
LA WIDTH: 3.87 CM
LEFT ATRIUM SIZE: 3.98 CM
LEFT ATRIUM VOLUME INDEX: 32.9 ML/M2
LEFT ATRIUM VOLUME: 69.33 CM3
LEFT INTERNAL DIMENSION IN SYSTOLE: 3.34 CM (ref 2.1–4)
LEFT VENTRICLE DIASTOLIC VOLUME INDEX: 56.47 ML/M2
LEFT VENTRICLE DIASTOLIC VOLUME: 119.16 ML
LEFT VENTRICLE MASS INDEX: 66 G/M2
LEFT VENTRICLE SYSTOLIC VOLUME INDEX: 21.6 ML/M2
LEFT VENTRICLE SYSTOLIC VOLUME: 45.48 ML
LEFT VENTRICULAR INTERNAL DIMENSION IN DIASTOLE: 5.02 CM (ref 3.5–6)
LEFT VENTRICULAR MASS: 140.02 G
LV LATERAL E/E' RATIO: 23.8 M/S
LV SEPTAL E/E' RATIO: 23.8 M/S
MV A" WAVE DURATION": 12.56 MSEC
MV MEAN GRADIENT: 3 MMHG
MV PEAK A VEL: 1.17 M/S
MV PEAK E VEL: 1.19 M/S
MV PEAK GRADIENT: 7 MMHG
MV STENOSIS PRESSURE HALF TIME: 75.62 MS
MV VALVE AREA BY CONTINUITY EQUATION: 1.67 CM2
MV VALVE AREA P 1/2 METHOD: 2.91 CM2
OHS CV CPX 1 MINUTE RECOVERY HEART RATE: 129 BPM
OHS CV CPX 85 PERCENT MAX PREDICTED HEART RATE MALE: 122
OHS CV CPX MAX PREDICTED HEART RATE: 144
OHS CV CPX PATIENT IS FEMALE: 1
OHS CV CPX PATIENT IS MALE: 0
OHS CV CPX PEAK DIASTOLIC BLOOD PRESSURE: 34 MMHG
OHS CV CPX PEAK HEAR RATE: 130 BPM
OHS CV CPX PEAK RATE PRESSURE PRODUCT: NORMAL
OHS CV CPX PEAK SYSTOLIC BLOOD PRESSURE: 135 MMHG
OHS CV CPX PERCENT MAX PREDICTED HEART RATE ACHIEVED: 91
OHS CV CPX RATE PRESSURE PRODUCT PRESENTING: 8384
OHS CV INITIAL DOSE: 10 MCG/KG/MIN
OHS CV PEAK DOSE: 30 MCG/KG/MIN
PISA TR MAX VEL: 2.8 M/S
POST STRESS EJECTION FRACTION: 75 %
PULM VEIN S/D RATIO: 1.3
PV PEAK D VEL: 0.43 M/S
PV PEAK S VEL: 0.56 M/S
RA MAJOR: 5.01 CM
RA PRESSURE ESTIMATED: 3 MMHG
RA WIDTH: 3.56 CM
RIGHT VENTRICULAR END-DIASTOLIC DIMENSION: 3.21 CM
RV TB RVSP: 6 MMHG
SINUS: 2.87 CM
STJ: 2.42 CM
SYSTOLIC BLOOD PRESSURE: 131 MMHG
TDI LATERAL: 0.05 M/S
TDI SEPTAL: 0.05 M/S
TDI: 0.05 M/S
TR MAX PG: 31 MMHG
TRICUSPID ANNULAR PLANE SYSTOLIC EXCURSION: 2.49 CM
TV REST PULMONARY ARTERY PRESSURE: 34 MMHG
Z-SCORE OF LEFT VENTRICULAR DIMENSION IN END DIASTOLE: -2.76
Z-SCORE OF LEFT VENTRICULAR DIMENSION IN END SYSTOLE: -1.5

## 2023-10-11 PROCEDURE — 93351 STRESS TTE COMPLETE: CPT | Mod: 26,,, | Performed by: INTERNAL MEDICINE

## 2023-10-11 PROCEDURE — 93351 STRESS TTE COMPLETE: CPT

## 2023-10-11 PROCEDURE — 93351 STRESS ECHO (CUPID ONLY): ICD-10-PCS | Mod: 26,,, | Performed by: INTERNAL MEDICINE

## 2023-10-11 RX ORDER — DOBUTAMINE HYDROCHLORIDE 400 MG/100ML
10 INJECTION INTRAVENOUS
Status: DISCONTINUED | OUTPATIENT
Start: 2023-10-11 | End: 2023-10-12 | Stop reason: HOSPADM

## 2023-10-17 NOTE — PROGRESS NOTES
The patient location is:  Patient Home   The chief complaint leading to consultation is: morbid obesity in work up for bariatric surgery  Visit type: Virtual visit with synchronous audio and video  Total time spent with patient: 30 minutes  Each patient to whom he or she provides medical services by telemedicine is:  (1) informed of the relationship between the physician and patient and the respective role of any other health care provider with respect to management of the patient; and (2) notified that he or she may decline to receive medical services by telemedicine and may withdraw from such care at any time.  Nutrition Handout located in AVS section of pt's [facility] and/or sent as a message via [facility] portal.  Pt informed of handout and how to access this information in [facility] anya.  NUTRITION NOTE    Referring Physician: Quinn Corrales M.D.   Reason for MNT Referral: Medically supervised diet pending Gastric Sleeve    Patient presents for f/u visit for MSD with weight loss over the past month; total weight loss by making numerous dietary and lifestyle changes. Cut out sweet instead eats protein bar    CLINICAL DATA:  73 y.o. female.    Current Weight: 248.9 lbs self reported on home scale with no clothes on  Weight Change Since Initial Visit: Loss of 14 lbs  Ideal Body Weight: 132 lbs  Body mass index is 45.52 kg/m².  Initial Wt: 262 lbs    DAILY NUTRITIONAL NEEDS: pre-op nutritional bariatric guidelines to promote weight loss  7709-6544 Calories    Grams Protein    CURRENT DIET:  Reduced-calorie diet.  Diet Recall: Food records are not present.  Current diet:  B: protein shake banana coffee skinny syrups  S:protein bar or small apple with 1-2 cheese string   L:turkey burger in air fryer with carrots  S: sometimes a banana or protein shake/bar  D: protein shake or [employer] beef tacos- did not eat all of the shell or chicken cooked in red gravy or pork chops  S: Sometimes another  shake    Previous dietary recall:  3 meals per day  B: cheerios banana and coffee skinny syrups  S: granola bar at times  L 45 calorie bread with turkey breast or leftovers from eating out  D: frozen entrees - arcelia callendars or lean cuisine  S: sometimes granola bar  Diet Includes:   Meal Pattern: Improved.  Protein Supplements: 1-2 per day.atkins, fairlife and slimfast  Snacking: Adequate. Snacks include healthy choices.  Vegetables: Likes a variety. Eats almost daily.  Fruits: Likes a variety. Eats daily.watermelon   Beverages: water, soda, diet tea crystal light, coffee without sugar, and decreasing amt whole milk in coffee  Dining out: Weekly. 1-3 times per week Every Sunday Mostly fast food and restaurants. Better choices   Cooking at home: Weekly. Varies from daily until 1 day week Mostly baked and grilled air fryer, crock potmeat, fish, starchy CHO, and vegetables.    Exercise:  Past exercise: Fair    Current exercise: Adequate  Pt pool therapy twice a week for an hour and [employer] fitness membership.  Just restarted due to recent fall.  Restrictions to exercise: knee pain    Vitamins / Minerals / Herbs:     3000 Vit D3  Melantonin  Labs:   Reviewed.    Food Allergies:   MSG reaction    Social:  Retired.  Lives with  and cat.  Alcohol: None.   Smoking: None.    ASSESSMENT:  Patient demonstrates some willingness to change lifestyle habits as evidenced by weight loss, good exercise, dietary changes, including protein drinks, increased fruits, increased vegetables, reduced dining out, better choices when dining out, more food preparation at steven, healthier cooking at home, and healthier snacking at home.    Doing well with working on greatest challenges (starchy CHO).    Barriers to Education:  none  Stage of Change:  action    NUTRITION DIAGNOSIS:  Morbid Obesity related to Physical inactivity as evidence by BMI.  Status: Improved    PLAN:  Pt is potential candidate for  surgery.    Diet: Adjust diet plan.  Reduce frequency of dining out.  More grocery shopping and meal preparation at home.  Increase exercise.  Return to clinic.    Exercise: Increase.    Behavior Modification: Begin to document food & activity logs daily.      Return to clinic in one month.  Needs additional visit(s) with RD.    Communicated nutrition plan with bariatric team.    SESSION TIME:  30 minutes

## 2023-10-18 ENCOUNTER — CLINICAL SUPPORT (OUTPATIENT)
Dept: BARIATRICS | Facility: CLINIC | Age: 73
End: 2023-10-18
Payer: MEDICARE

## 2023-10-18 VITALS — BODY MASS INDEX: 45.52 KG/M2 | WEIGHT: 248.88 LBS

## 2023-10-18 DIAGNOSIS — E66.01 MORBID OBESITY WITH BMI OF 45.0-49.9, ADULT: ICD-10-CM

## 2023-10-18 DIAGNOSIS — Z71.3 DIETARY COUNSELING AND SURVEILLANCE: Primary | ICD-10-CM

## 2023-10-18 PROCEDURE — 99499 NO LOS: ICD-10-PCS | Mod: 95,,, | Performed by: DIETITIAN, REGISTERED

## 2023-10-18 PROCEDURE — 97803 MED NUTRITION INDIV SUBSEQ: CPT | Mod: 95,GZ,, | Performed by: DIETITIAN, REGISTERED

## 2023-10-18 PROCEDURE — 99499 UNLISTED E&M SERVICE: CPT | Mod: 95,,, | Performed by: DIETITIAN, REGISTERED

## 2023-10-18 PROCEDURE — 97803 PR MED NUTR THER, SUBSQ, INDIV, EA 15 MIN: ICD-10-PCS | Mod: 95,GZ,, | Performed by: DIETITIAN, REGISTERED

## 2023-10-19 ENCOUNTER — OFFICE VISIT (OUTPATIENT)
Dept: PRIMARY CARE CLINIC | Facility: CLINIC | Age: 73
End: 2023-10-19
Payer: MEDICARE

## 2023-10-19 VITALS
WEIGHT: 253.5 LBS | TEMPERATURE: 97 F | HEIGHT: 62 IN | BODY MASS INDEX: 46.65 KG/M2 | HEART RATE: 99 BPM | DIASTOLIC BLOOD PRESSURE: 68 MMHG | OXYGEN SATURATION: 95 % | RESPIRATION RATE: 18 BRPM | SYSTOLIC BLOOD PRESSURE: 124 MMHG

## 2023-10-19 DIAGNOSIS — J30.2 SEASONAL ALLERGIES: ICD-10-CM

## 2023-10-19 DIAGNOSIS — M25.561 CHRONIC PAIN OF BOTH KNEES: ICD-10-CM

## 2023-10-19 DIAGNOSIS — Z23 NEED FOR VACCINATION: ICD-10-CM

## 2023-10-19 DIAGNOSIS — Z76.89 ENCOUNTER TO ESTABLISH CARE: Primary | ICD-10-CM

## 2023-10-19 DIAGNOSIS — M25.562 CHRONIC PAIN OF BOTH KNEES: ICD-10-CM

## 2023-10-19 DIAGNOSIS — F98.8 ATTENTION DEFICIT DISORDER, UNSPECIFIED HYPERACTIVITY PRESENCE: ICD-10-CM

## 2023-10-19 DIAGNOSIS — G89.29 CHRONIC PAIN OF BOTH KNEES: ICD-10-CM

## 2023-10-19 PROCEDURE — 3288F FALL RISK ASSESSMENT DOCD: CPT | Mod: CPTII,S$GLB,, | Performed by: STUDENT IN AN ORGANIZED HEALTH CARE EDUCATION/TRAINING PROGRAM

## 2023-10-19 PROCEDURE — 90677 PCV20 VACCINE IM: CPT | Mod: S$GLB,,, | Performed by: STUDENT IN AN ORGANIZED HEALTH CARE EDUCATION/TRAINING PROGRAM

## 2023-10-19 PROCEDURE — 3288F PR FALLS RISK ASSESSMENT DOCUMENTED: ICD-10-PCS | Mod: CPTII,S$GLB,, | Performed by: STUDENT IN AN ORGANIZED HEALTH CARE EDUCATION/TRAINING PROGRAM

## 2023-10-19 PROCEDURE — 1101F PR PT FALLS ASSESS DOC 0-1 FALLS W/OUT INJ PAST YR: ICD-10-PCS | Mod: CPTII,S$GLB,, | Performed by: STUDENT IN AN ORGANIZED HEALTH CARE EDUCATION/TRAINING PROGRAM

## 2023-10-19 PROCEDURE — 3008F PR BODY MASS INDEX (BMI) DOCUMENTED: ICD-10-PCS | Mod: CPTII,S$GLB,, | Performed by: STUDENT IN AN ORGANIZED HEALTH CARE EDUCATION/TRAINING PROGRAM

## 2023-10-19 PROCEDURE — 90677 PNEUMOCOCCAL CONJUGATE VACCINE 20-VALENT: ICD-10-PCS | Mod: S$GLB,,, | Performed by: STUDENT IN AN ORGANIZED HEALTH CARE EDUCATION/TRAINING PROGRAM

## 2023-10-19 PROCEDURE — 3078F DIAST BP <80 MM HG: CPT | Mod: CPTII,S$GLB,, | Performed by: STUDENT IN AN ORGANIZED HEALTH CARE EDUCATION/TRAINING PROGRAM

## 2023-10-19 PROCEDURE — 90694 FLU VACCINE - QUADRIVALENT - ADJUVANTED: ICD-10-PCS | Mod: S$GLB,,, | Performed by: STUDENT IN AN ORGANIZED HEALTH CARE EDUCATION/TRAINING PROGRAM

## 2023-10-19 PROCEDURE — 3008F BODY MASS INDEX DOCD: CPT | Mod: CPTII,S$GLB,, | Performed by: STUDENT IN AN ORGANIZED HEALTH CARE EDUCATION/TRAINING PROGRAM

## 2023-10-19 PROCEDURE — 1159F MED LIST DOCD IN RCRD: CPT | Mod: CPTII,S$GLB,, | Performed by: STUDENT IN AN ORGANIZED HEALTH CARE EDUCATION/TRAINING PROGRAM

## 2023-10-19 PROCEDURE — 3044F PR MOST RECENT HEMOGLOBIN A1C LEVEL <7.0%: ICD-10-PCS | Mod: CPTII,S$GLB,, | Performed by: STUDENT IN AN ORGANIZED HEALTH CARE EDUCATION/TRAINING PROGRAM

## 2023-10-19 PROCEDURE — 99999 PR PBB SHADOW E&M-EST. PATIENT-LVL IV: CPT | Mod: PBBFAC,,, | Performed by: STUDENT IN AN ORGANIZED HEALTH CARE EDUCATION/TRAINING PROGRAM

## 2023-10-19 PROCEDURE — 1125F AMNT PAIN NOTED PAIN PRSNT: CPT | Mod: CPTII,S$GLB,, | Performed by: STUDENT IN AN ORGANIZED HEALTH CARE EDUCATION/TRAINING PROGRAM

## 2023-10-19 PROCEDURE — G0009 PNEUMOCOCCAL CONJUGATE VACCINE 20-VALENT: ICD-10-PCS | Mod: S$GLB,,, | Performed by: STUDENT IN AN ORGANIZED HEALTH CARE EDUCATION/TRAINING PROGRAM

## 2023-10-19 PROCEDURE — 1160F RVW MEDS BY RX/DR IN RCRD: CPT | Mod: CPTII,S$GLB,, | Performed by: STUDENT IN AN ORGANIZED HEALTH CARE EDUCATION/TRAINING PROGRAM

## 2023-10-19 PROCEDURE — 1160F PR REVIEW ALL MEDS BY PRESCRIBER/CLIN PHARMACIST DOCUMENTED: ICD-10-PCS | Mod: CPTII,S$GLB,, | Performed by: STUDENT IN AN ORGANIZED HEALTH CARE EDUCATION/TRAINING PROGRAM

## 2023-10-19 PROCEDURE — G0008 ADMIN INFLUENZA VIRUS VAC: HCPCS | Mod: S$GLB,,, | Performed by: STUDENT IN AN ORGANIZED HEALTH CARE EDUCATION/TRAINING PROGRAM

## 2023-10-19 PROCEDURE — 3074F SYST BP LT 130 MM HG: CPT | Mod: CPTII,S$GLB,, | Performed by: STUDENT IN AN ORGANIZED HEALTH CARE EDUCATION/TRAINING PROGRAM

## 2023-10-19 PROCEDURE — 3044F HG A1C LEVEL LT 7.0%: CPT | Mod: CPTII,S$GLB,, | Performed by: STUDENT IN AN ORGANIZED HEALTH CARE EDUCATION/TRAINING PROGRAM

## 2023-10-19 PROCEDURE — 1125F PR PAIN SEVERITY QUANTIFIED, PAIN PRESENT: ICD-10-PCS | Mod: CPTII,S$GLB,, | Performed by: STUDENT IN AN ORGANIZED HEALTH CARE EDUCATION/TRAINING PROGRAM

## 2023-10-19 PROCEDURE — 99214 PR OFFICE/OUTPT VISIT, EST, LEVL IV, 30-39 MIN: ICD-10-PCS | Mod: S$GLB,,, | Performed by: STUDENT IN AN ORGANIZED HEALTH CARE EDUCATION/TRAINING PROGRAM

## 2023-10-19 PROCEDURE — 3078F PR MOST RECENT DIASTOLIC BLOOD PRESSURE < 80 MM HG: ICD-10-PCS | Mod: CPTII,S$GLB,, | Performed by: STUDENT IN AN ORGANIZED HEALTH CARE EDUCATION/TRAINING PROGRAM

## 2023-10-19 PROCEDURE — 1159F PR MEDICATION LIST DOCUMENTED IN MEDICAL RECORD: ICD-10-PCS | Mod: CPTII,S$GLB,, | Performed by: STUDENT IN AN ORGANIZED HEALTH CARE EDUCATION/TRAINING PROGRAM

## 2023-10-19 PROCEDURE — G0009 ADMIN PNEUMOCOCCAL VACCINE: HCPCS | Mod: S$GLB,,, | Performed by: STUDENT IN AN ORGANIZED HEALTH CARE EDUCATION/TRAINING PROGRAM

## 2023-10-19 PROCEDURE — G0008 FLU VACCINE - QUADRIVALENT - ADJUVANTED: ICD-10-PCS | Mod: S$GLB,,, | Performed by: STUDENT IN AN ORGANIZED HEALTH CARE EDUCATION/TRAINING PROGRAM

## 2023-10-19 PROCEDURE — 99999 PR PBB SHADOW E&M-EST. PATIENT-LVL IV: ICD-10-PCS | Mod: PBBFAC,,, | Performed by: STUDENT IN AN ORGANIZED HEALTH CARE EDUCATION/TRAINING PROGRAM

## 2023-10-19 PROCEDURE — 99214 OFFICE O/P EST MOD 30 MIN: CPT | Mod: S$GLB,,, | Performed by: STUDENT IN AN ORGANIZED HEALTH CARE EDUCATION/TRAINING PROGRAM

## 2023-10-19 PROCEDURE — 1101F PT FALLS ASSESS-DOCD LE1/YR: CPT | Mod: CPTII,S$GLB,, | Performed by: STUDENT IN AN ORGANIZED HEALTH CARE EDUCATION/TRAINING PROGRAM

## 2023-10-19 PROCEDURE — 3074F PR MOST RECENT SYSTOLIC BLOOD PRESSURE < 130 MM HG: ICD-10-PCS | Mod: CPTII,S$GLB,, | Performed by: STUDENT IN AN ORGANIZED HEALTH CARE EDUCATION/TRAINING PROGRAM

## 2023-10-19 PROCEDURE — 90694 VACC AIIV4 NO PRSRV 0.5ML IM: CPT | Mod: S$GLB,,, | Performed by: STUDENT IN AN ORGANIZED HEALTH CARE EDUCATION/TRAINING PROGRAM

## 2023-10-19 RX ORDER — FLUTICASONE PROPIONATE 50 MCG
2 SPRAY, SUSPENSION (ML) NASAL DAILY
Qty: 15.8 ML | Refills: 5 | Status: SHIPPED | OUTPATIENT
Start: 2023-10-19

## 2023-10-19 RX ORDER — DEXMETHYLPHENIDATE HYDROCHLORIDE 5 MG/1
5 TABLET ORAL 2 TIMES DAILY
Qty: 60 TABLET | Refills: 0 | Status: SHIPPED | OUTPATIENT
Start: 2023-10-19 | End: 2023-12-22 | Stop reason: SDUPTHER

## 2023-10-19 RX ORDER — DICLOFENAC SODIUM 10 MG/G
2 GEL TOPICAL DAILY
Qty: 100 G | Refills: 5 | Status: SHIPPED | OUTPATIENT
Start: 2023-10-19

## 2023-10-19 RX ORDER — LEVOCETIRIZINE DIHYDROCHLORIDE 5 MG/1
5 TABLET, FILM COATED ORAL NIGHTLY
Qty: 30 TABLET | Refills: 11 | Status: SHIPPED | OUTPATIENT
Start: 2023-10-19 | End: 2024-01-04 | Stop reason: SDUPTHER

## 2023-10-19 RX ORDER — DEXMETHYLPHENIDATE HYDROCHLORIDE 5 MG/1
5 TABLET ORAL 2 TIMES DAILY
Qty: 60 TABLET | Refills: 0 | Status: SHIPPED | OUTPATIENT
Start: 2023-11-16 | End: 2024-01-04 | Stop reason: SDUPTHER

## 2023-10-19 RX ORDER — DEXMETHYLPHENIDATE HYDROCHLORIDE 5 MG/1
5 TABLET ORAL 2 TIMES DAILY
Qty: 60 TABLET | Refills: 0 | Status: SHIPPED | OUTPATIENT
Start: 2023-12-16 | End: 2024-01-04 | Stop reason: SDUPTHER

## 2023-10-19 NOTE — PROGRESS NOTES
Identified pt. By name and   Administered flu vaccine right deltoid and prevnar 20 vaccine in right ventrogluteal using aseptic technique.

## 2023-10-19 NOTE — PROGRESS NOTES
Subjective:       Patient ID: Elizabeth Au is a 73 y.o. female.    Chief Complaint: Establish Care      HPI:  73 y.o. female presents to [facility] to establish care    Acute concerns?: Patient recently moved back home and hoping to resume medication Focalin    Was being filled through Dr Jannette Morales MD   Medication working well?: Yes  Palpitations/chest pains?: No  Appetite change?: No  Anxiety?: No  Insomnia?: Night Owl, not worse on medication    Last PCP?: Dr Jannette Morales  Allergies: PCNs (hives)  Medical History: Breast Cancer, neuropathy, asthma, chronic bronchitis, chronic back pain, left knee osteoarthritis, GERD, hypothyroidism, HLD, IBS  Medications: albuterol HFA 90 mcg/actuation PRN, atorvastatin 10 mg, gabapentin 200 mg qAM 300 mg qPM, Norco 5mg PRN, levothyroxine 125 mcg, omeprazzole 20 mg, Trelegy ellipta 200-62.5-25 mcg/actuation  Surgical History: appendectomy, lumpectomy left breast LN dissection, 2012, c/s x2, cataract extraction left eye, tonsillectomy, tubal ligation, right knee replacement  Family History: Maternal grandmother unknown cancer, maternal great aunt unknown cancer, father prostate cancer; no known autoimmune disease  Social History: Never smoker, no EtOH, no illicit    Colonoscopy Hx?: Has been some time back, Was on Cologuard  Flu vaccine?: Amenable    Follows with Dr. Nathaniel Wen for Pain Management.    Review of Systems   Constitutional:  Positive for fatigue (Feels weight related). Negative for chills, diaphoresis and fever.   HENT:  Positive for sore throat (Occurs with season change). Negative for congestion, sinus pressure and sneezing.    Respiratory:  Negative for cough and shortness of breath.    Cardiovascular:  Negative for chest pain and palpitations (Only if not drinking enough water).   Gastrointestinal:  Positive for constipation (Occasional with IBS). Negative for abdominal pain, diarrhea, nausea and vomiting.   Musculoskeletal:  Positive  "for arthralgias (Bilateral knees, L>R). Negative for joint swelling and myalgias.   Skin:  Negative for rash and wound.   Neurological:  Negative for dizziness, light-headedness (If moving too quickly) and headaches.       Objective:      Vitals:    10/19/23 0957   BP: 124/68   BP Location: Left arm   Patient Position: Sitting   BP Method: Medium (Manual)   Pulse: 99   Resp: 18   Temp: 97.3 °F (36.3 °C)   TempSrc: Temporal   SpO2: 95%   Weight: 115 kg (253 lb 8.5 oz)   Height: 5' 2" (1.575 m)     Physical Exam  Vitals reviewed.   Constitutional:       General: She is not in acute distress.     Appearance: Normal appearance. She is not ill-appearing.   HENT:      Head: Normocephalic and atraumatic.   Eyes:      General:         Right eye: No discharge.         Left eye: No discharge.      Conjunctiva/sclera: Conjunctivae normal.   Cardiovascular:      Rate and Rhythm: Normal rate and regular rhythm.      Pulses: Normal pulses.      Heart sounds: No murmur heard.  Pulmonary:      Effort: Pulmonary effort is normal.      Breath sounds: Normal breath sounds.   Musculoskeletal:         General: No deformity.      Cervical back: Neck supple. No rigidity.   Lymphadenopathy:      Cervical: No cervical adenopathy.   Skin:     General: Skin is warm and dry.      Coloration: Skin is not jaundiced.   Neurological:      General: No focal deficit present.      Mental Status: She is alert and oriented to person, place, and time.   Psychiatric:         Mood and Affect: Mood normal.         Behavior: Behavior normal.             Lab Results   Component Value Date     09/21/2023    K 4.1 09/21/2023     09/21/2023    CO2 23 09/21/2023    BUN 17 09/21/2023    CREATININE 0.7 09/21/2023    ANIONGAP 10 09/21/2023     Lab Results   Component Value Date    HGBA1C 5.2 09/21/2023     No results found for: "BNP", "BNPTRIAGEBLO"    Lab Results   Component Value Date    WBC 6.72 09/21/2023    HGB 14.1 09/21/2023    HCT 43.0 " 09/21/2023     09/21/2023    GRAN 3.3 09/21/2023    GRAN 48.7 09/21/2023     Lab Results   Component Value Date    CHOL 164 09/21/2023    HDL 53 09/21/2023    LDLCALC 94.4 09/21/2023    TRIG 83 09/21/2023          Current Outpatient Medications:     albuterol (PROVENTIL/VENTOLIN HFA) 90 mcg/actuation inhaler, , Disp: , Rfl:     atorvastatin (LIPITOR) 10 MG tablet, Take 10 mg by mouth., Disp: , Rfl:     citalopram (CELEXA) 20 MG tablet, Take 20 mg by mouth., Disp: , Rfl:     gabapentin (NEURONTIN) 100 MG capsule, Take by mouth. 200mg in the morning and 300mg at night, Disp: , Rfl:     HYDROcodone-acetaminophen (NORCO) 5-325 mg per tablet, Take 1 tablet by mouth 2 (two) times daily as needed., Disp: , Rfl:     levothyroxine (SYNTHROID) 125 MCG tablet, Take 125 mcg by mouth before breakfast., Disp: , Rfl:     omeprazole (PRILOSEC) 20 MG capsule, Take 20 mg by mouth once daily., Disp: , Rfl:     TRELEGY ELLIPTA 200-62.5-25 mcg inhaler, Inhale 1 puff into the lungs., Disp: , Rfl:     dexmethylphenidate (FOCALIN) 5 MG tablet, Take 1 tablet (5 mg total) by mouth 2 (two) times daily., Disp: 60 tablet, Rfl: 0    [START ON 12/16/2023] dexmethylphenidate (FOCALIN) 5 MG tablet, Take 1 tablet (5 mg total) by mouth 2 (two) times daily., Disp: 60 tablet, Rfl: 0    [START ON 11/16/2023] dexmethylphenidate (FOCALIN) 5 MG tablet, Take 1 tablet (5 mg total) by mouth 2 (two) times daily., Disp: 60 tablet, Rfl: 0    diclofenac sodium (VOLTAREN ARTHRITIS PAIN) 1 % Gel, Apply 2 g topically once daily., Disp: 100 g, Rfl: 5    fluticasone propionate (FLONASE) 50 mcg/actuation nasal spray, 2 sprays (100 mcg total) by Each Nostril route once daily., Disp: 15.8 mL, Rfl: 5    levocetirizine (XYZAL) 5 MG tablet, Take 1 tablet (5 mg total) by mouth every evening., Disp: 30 tablet, Rfl: 11        Assessment:       1. Encounter to establish care    2. Need for vaccination    3. Seasonal allergies    4. Chronic pain of both knees    5.  Attention deficit disorder, unspecified hyperactivity presence           Plan:       Encounter to establish care    Need for vaccination  -     Influenza (FLUAD) - Quadrivalent (Adjuvanted) *Preferred* (65+) (PF)  -     (In Office Administered) Pneumococcal Conjugate Vaccine (20 Valent) (IM) (Preferred)    Seasonal allergies  -     fluticasone propionate (FLONASE) 50 mcg/actuation nasal spray; 2 sprays (100 mcg total) by Each Nostril route once daily.  Dispense: 15.8 mL; Refill: 5  -     levocetirizine (XYZAL) 5 MG tablet; Take 1 tablet (5 mg total) by mouth every evening.  Dispense: 30 tablet; Refill: 11    Chronic pain of both knees  -     diclofenac sodium (VOLTAREN ARTHRITIS PAIN) 1 % Gel; Apply 2 g topically once daily.  Dispense: 100 g; Refill: 5    Attention deficit disorder, unspecified hyperactivity presence  -     dexmethylphenidate (FOCALIN) 5 MG tablet; Take 1 tablet (5 mg total) by mouth 2 (two) times daily.  Dispense: 60 tablet; Refill: 0  -     dexmethylphenidate (FOCALIN) 5 MG tablet; Take 1 tablet (5 mg total) by mouth 2 (two) times daily.  Dispense: 60 tablet; Refill: 0  -     dexmethylphenidate (FOCALIN) 5 MG tablet; Take 1 tablet (5 mg total) by mouth 2 (two) times daily.  Dispense: 60 tablet; Refill: 0    RTC in 11 weeks

## 2023-10-25 ENCOUNTER — PATIENT MESSAGE (OUTPATIENT)
Dept: BARIATRICS | Facility: CLINIC | Age: 73
End: 2023-10-25
Payer: MEDICARE

## 2023-11-14 ENCOUNTER — PATIENT MESSAGE (OUTPATIENT)
Dept: BARIATRICS | Facility: CLINIC | Age: 73
End: 2023-11-14
Payer: MEDICARE

## 2023-11-14 NOTE — PROGRESS NOTES
The patient location is:  Patient Home   The chief complaint leading to consultation is: morbid obesity in work up for bariatric surgery  Visit type: Virtual visit with synchronous audio and video  Total time spent with patient: 30 minutes  Each patient to whom he or she provides medical services by telemedicine is:  (1) informed of the relationship between the physician and patient and the respective role of any other health care provider with respect to management of the patient; and (2) notified that he or she may decline to receive medical services by telemedicine and may withdraw from such care at any time.  Nutrition Handout located in AVS section of pt's [facility] and/or sent as a message via [facility].  Pt informed of handout and how to access this information in [facility].  NUTRITION NOTE    Referring Physician: Quinn Corrales M.D.   Reason for MNT Referral: Medically supervised diet pending Gastric Sleeve    Patient presents for f/u visit for MSD with weight loss over the past month; total weight loss by making numerous dietary and lifestyle changes. Cut out sweet instead eats protein bar    CLINICAL DATA:  73 y.o. female.    Current Weight: 247.2 lbs self reported on home scale with no clothes on  Weight Change Since Initial Visit: Loss of 15 lbs  Ideal Body Weight: 132 lbs  Body mass index is 45.21 kg/m².  Last Weight 248.9  Initial Wt: 262 lbs    DAILY NUTRITIONAL NEEDS: pre-op nutritional bariatric guidelines to promote weight loss  6239-3821 Calories    Grams Protein    CURRENT DIET:  Reduced-calorie diet. 0456-1697 calories per day  Diet Recall: Food records are present.  Previous diet:  B: protein shake banana coffee skinny syrups  S:protein bar or small apple with 1-2 cheese string   L:turkey burger in air fryer with carrots  S: sometimes a banana or protein shake/bar  D: protein shake or [employer] beef tacos- did not eat all of the shell or chicken cooked in red gravy or  pork chops  S: Sometimes another shake    Current dietary recall:  3 meals per day  B: sausage egg burrito or banana with protein shake coffee with skinny syrups  S: P3 or banana if did not eat for breakfast or pure protein bars  L: turkey burger in air fryer with carrots  Dangel hair pasta or pizza with tortilla with turkey pepperoni and low fat cheese, red beans no rice or pork tenderloins 5 oz   Diet Includes:   Meal Pattern: Improved.  Protein Supplements: 1-2 per day.atkins, fairlife and slimfast  Snacking: Adequate. Snacks include healthy choices.  Vegetables: Likes a variety. Eats almost daily.  Fruits: Likes a variety. Eats daily.watermelon   Beverages: water, soda, diet tea crystal light, coffee without sugar, and decreasing amt whole milk in coffee  Dining out: Weekly. 1-3 times per week Every Sunday Mostly fast food and restaurants. Better choices   Cooking at home: Weekly. Varies from daily until 1 day week Mostly baked and grilled air fryer, crock potmeat, fish, decreased starchy CHO, and vegetables.    Exercise:  Past exercise: Fair  Current exercise: Adequate  Pt pool therapy twice a week for an hour and [employer] fitness membership fitness membership.  Restrictions to exercise: knee pain    Vitamins / Minerals / Herbs:   Melantonin    Labs:   Reviewed.    Food Allergies:   MSG reaction    Social:  Retired.  Lives with  and cat.  Alcohol: None.   Smoking: None.    ASSESSMENT:  Patient demonstrates some willingness to change lifestyle habits as evidenced by weight loss, good exercise, dietary changes, including protein drinks, increased fruits, increased vegetables, reduced dining out, better choices when dining out, more food preparation at steven, healthier cooking at home, and healthier snacking at home.    Doing well with working on greatest challenges (starchy CHO).    Barriers to Education:  none  Stage of Change:  action    NUTRITION DIAGNOSIS:  Morbid Obesity related to Physical inactivity as  evidence by BMI.  Status: Improved    PLAN:  Pt is potential candidate for surgery.    Diet: Adjust diet plan.  Reduce frequency of dining out.  More grocery shopping and meal preparation at home.  Increase exercise.  Return to clinic.    Exercise: Increase.    Behavior Modification: Begin to document food & activity logs daily.      Return to clinic in one month.  Needs additional visit(s) with RD.    Communicated nutrition plan with bariatric team.    SESSION TIME:  30 minutes

## 2023-11-15 ENCOUNTER — CLINICAL SUPPORT (OUTPATIENT)
Dept: BARIATRICS | Facility: CLINIC | Age: 73
End: 2023-11-15
Payer: MEDICARE

## 2023-11-15 VITALS — WEIGHT: 247.19 LBS | BODY MASS INDEX: 45.21 KG/M2

## 2023-11-15 DIAGNOSIS — E66.01 MORBID OBESITY WITH BMI OF 45.0-49.9, ADULT: ICD-10-CM

## 2023-11-15 DIAGNOSIS — Z71.3 DIETARY COUNSELING AND SURVEILLANCE: Primary | ICD-10-CM

## 2023-11-15 PROCEDURE — 97803 PR MED NUTR THER, SUBSQ, INDIV, EA 15 MIN: ICD-10-PCS | Mod: 95,GZ,, | Performed by: DIETITIAN, REGISTERED

## 2023-11-15 PROCEDURE — 99499 UNLISTED E&M SERVICE: CPT | Mod: 95,,, | Performed by: DIETITIAN, REGISTERED

## 2023-11-15 PROCEDURE — 97803 MED NUTRITION INDIV SUBSEQ: CPT | Mod: 95,GZ,, | Performed by: DIETITIAN, REGISTERED

## 2023-11-15 PROCEDURE — 99499 NO LOS: ICD-10-PCS | Mod: 95,,, | Performed by: DIETITIAN, REGISTERED

## 2023-11-16 DIAGNOSIS — Z78.0 MENOPAUSE: ICD-10-CM

## 2023-12-12 ENCOUNTER — PATIENT MESSAGE (OUTPATIENT)
Dept: BARIATRICS | Facility: CLINIC | Age: 73
End: 2023-12-12
Payer: MEDICARE

## 2023-12-13 ENCOUNTER — PATIENT MESSAGE (OUTPATIENT)
Dept: BARIATRICS | Facility: CLINIC | Age: 73
End: 2023-12-13

## 2023-12-13 ENCOUNTER — CLINICAL SUPPORT (OUTPATIENT)
Dept: BARIATRICS | Facility: CLINIC | Age: 73
End: 2023-12-13
Payer: MEDICARE

## 2023-12-13 VITALS — BODY MASS INDEX: 44.92 KG/M2 | WEIGHT: 245.56 LBS

## 2023-12-13 DIAGNOSIS — Z71.3 DIETARY COUNSELING AND SURVEILLANCE: Primary | ICD-10-CM

## 2023-12-13 DIAGNOSIS — E66.01 MORBID OBESITY WITH BMI OF 40.0-44.9, ADULT: ICD-10-CM

## 2023-12-13 PROCEDURE — 99999 PR PBB SHADOW E&M-EST. PATIENT-LVL II: ICD-10-PCS | Mod: PBBFAC,,, | Performed by: DIETITIAN, REGISTERED

## 2023-12-13 PROCEDURE — 97803 MED NUTRITION INDIV SUBSEQ: CPT | Mod: GZ,S$GLB,, | Performed by: DIETITIAN, REGISTERED

## 2023-12-13 PROCEDURE — 99999 PR PBB SHADOW E&M-EST. PATIENT-LVL II: CPT | Mod: PBBFAC,,, | Performed by: DIETITIAN, REGISTERED

## 2023-12-13 PROCEDURE — 99499 UNLISTED E&M SERVICE: CPT | Mod: S$GLB,,, | Performed by: DIETITIAN, REGISTERED

## 2023-12-13 PROCEDURE — 99499 NO LOS: ICD-10-PCS | Mod: S$GLB,,, | Performed by: DIETITIAN, REGISTERED

## 2023-12-13 PROCEDURE — 97803 PR MED NUTR THER, SUBSQ, INDIV, EA 15 MIN: ICD-10-PCS | Mod: GZ,S$GLB,, | Performed by: DIETITIAN, REGISTERED

## 2023-12-13 NOTE — PATIENT INSTRUCTIONS
"Pre-op Liquid Protein Diet        Two weeks prior to your bariatric surgery, your surgeon and dietitian will have you start a liquid diet. (One week liquid diet if your BMI is under 40). You will lose weight by making these changes before surgery, which will shrink your liver and decrease the size of your abdomen.  This will help to decrease your risk of complications during surgery.  Your dietitian will help you to decide which protein drinks are right for you.      There are a number of places where protein drinks are available. They can be found at the local grocery, [employer] or drug store and even online. Aim for 600-800 calories and  grams of protein per day while on the liquid diet. Please read the label - no more than 4 grams of sugar per serving. Listed below are some of the protein supplements [facility] recommends, along with where to buy them locally or online.     Ready-to-Drink (RTD) Suggestions    BOTTLED MILK SHAKES Where to Purchase Calories Protein (g) Sugar (g)   Premier Protein Ecorithm 160 30 1   Equate High Performance Cloud Takeoff 160 30 1   Muscle Milk (lactose free) Cerebrex 160 20 0-3   IndianStage Nutrition Plan or Core Power (lactose free) NatureBox 170 30 4   Total Lean 25 (lactose free)  25 2   Quest protein shakes [employer] 160 30 1   SMOOTHIES       "The Gladiator" 20oz (without fruit) Smoothie Teo 180 45 0   Protein Velvet Ice   PJ's Coffee 230 21 8   BOTTLED CLEAR DRINKS       Premier Clear Paracelsus Labs 90 20 0   Protein. 2.O Mobimedia 60 15 0   Isopure Zero Carb  40 0    Clear [place]'s, [employer], Amazon 110 25 0         Protein Powder Suggestions    Don't forget to count the calories and protein from the liquids that you add to your protein powders in your food journal. Protein powders may be mixed with fat-free or 1% milk. Lactose-free options include: water, sugar-free flavored beverages such as Crystal Light, " Fairlife fat-free milk (13g protein per 8oz!), unsweetened soy milk, or unsweetened almond milk. Do not add fruit, yogurt, honey, peanut butter, or vegetables to protein shakes. You may add flavor extracts or sugar-free syrups (shop online or at World Market) for flavor variety.    Product Where to Purchase Calories Protein (g) Sugar (g)   Body Fortress [employer] 140 26 1   Pure Protein [employer]  20-23 0-2   Unjury  [email or web address]  20 3-4    Unjury PLANTED:  (vegan, gluten-free, Stevia) [email or web address] 100-130 20 1-3   Joseph Mika egg white (Stevia) www.Garden Price 120 24 0-1   Kiana  (wide variety of flavors) Vitamin Shoppe, [facility] 100 24 0   Isopure Zero Carb  GNC   105 25 0     Lactose-free options: Muscle Milk, GNC Total Lean 25 (RTD and powder), Isopure zero carb (RTD and powder), Unjury PLANTED (powder), Joseph Mika egg white (powder), Nectar (powder), Protein 2 O (RTD)    Unflavored options:  Unjury, Joseph Mika egg white, Isopure Zero Carb    Protein Soup Suggestions    Product Flavors Where to purchase calories protein sugar   Celebrate Cream of Vegetable, Cream of Broccoli & Cheese, Tomato soup [email or web address]  [facility]  15 < 1   Unjury Chicken soup, [place] [email or web address]   21-22 0-1             How to Choose Fluids: All fluids before and after surgery should be sugar free, non-carbonated and low calorie (less than 15 calories per serving).  May have unlimited amounts before surgery.     Options:  Plain Water (or Infused with lemon/lime/orange, berries, mint leaves, cucumber slices)  Flavored hartley - Propel Zero, Nestle Pure Life Splash, Aquafina Flavor Splash, Hint Water        Decaffeinated Coffee or tea - No green tea 2 weeks prior to surgery  Sugar free (SF) flavorings/Water enhancers - Crystal Light, [place], Wyler's Light, SF John-aid, SF Hawaiian Punch, Dasani Drops, Great Value/Market Pantry SF drink mix  Diet lemonade  Powerade or Gatorade Zero   Fuze Slenderize (Low  carb)  Diet ocean spray cranberry juices or Diet V-8 Splash  SF Jello and SF popsicles  Low sodium broth              AFTER SURGERY  Bariatric High Protein Liquid Diet: Weeks 1+2 after surgery    Begin Protein Liquid Diet when you get home from the hospital.  Day 1: Aim to finish at least 1 protein supplement (protein powder + water, Isopure clear in glass bottle from [facility], Premier Clear, Protein 2.0) and 1 bottle of water or Crystal Light. Drink more if you can.  Days 2-3: Increase protein shakes and fluids as tolerated. Sip on 1 oz every 15 minutes. May begin mixing protein powder with skim-1% milk or Lactaid/Soy milk as tolerated.   Days 4-6: If you haven't already, try to mix protein powder with skim-1% milk or Lactaid/Soy milk instead of water, to increase calorie and protein intake. May begin to sip on ready-to-drink protein shakes (such as Premier protein shakes), as tolerated.    Protein and Fluid Goals:  1 week after surgery: aim for at least 40 grams of protein per day and at least 24 ounces of water or Crystal Light.  2 weeks after surgery: aim for 80 grams of protein per day and 48 ounces of water or Crystal Light.    Tips:  Ready-to-drink protein shakes and smoothies may be too thick for the first week after surgery, making it difficult to reach your protein goals. Start with clear liquids and advance as tolerated.  Sip slowly and continuously on protein shakes and water throughout the day. Start with 1oz liquids every 15 minutes; advance as tolerated.    Avoid sugary drinks. Limit caffeinated beverages to 8oz per day for the first 2-3 months. Avoid carbonated beverages and drinking through straws to reduce gas and bloating for at least 3 months.     Vitamins/Minerals:  Do not take fish oils, herbal supplements including green tea for 2 weeks before your surgery as a general precaution.  Do not take any vitamin supplements for 1 week prior to surgery  Start taking your vitamins when you get home from the  [facility].   See vitamin section on page 20 in the Nutrition Guidebook.    Light Exercise -   If your doctor has cleared you for walking or bike riding before your surgery, you can continue this after surgery.  Try not to lift anything heavier than 10 lbs for the first 6 weeks after surgery.    When you come to the clinic for your 2 week follow up, the mid-level provider and dietitian will discuss advancing your diet. Please bring in a log of your daily protein and fluid intake. Please bring in your vitamins for review if you have not already. If you have any questions about your diet or vitamins call [phone] and ask for a Bariatric Dietitian. For other questions, please ask for the Bariatric Nurses.        Get Ready For Your Surgery  Nutrition Checklist        ?? RTD PROTEIN SHAKES  Ex: Premier Protein, Unjury, Muscle Milk, EAS AdvantEDGE Carb Control    ?? PROTEIN POWDER OR  Ex: Body Fortress, Pure Protein, Unjury, Nectar, Isopure Zero Carb    ?? PROTEIN WATER  Ex: Premier Clear, Protein 2.0, Isopure Zero Carb RTD    ?? SUGAR-FREE, DECAFFEINATED, NON-CARBONATED BEVERAGES  Ex: Bottled/tap water, Diet Snapple, Crystal Light, Vitamin Water Zero, Tea/Coffee,  Sugar-free popsicles and Jell-O, Low sodium broth or unsalted chicken stock, Propel  Water, Powerade Zero    ?? GAS-X CHEWABLES    ?? MULTIVITAMIN WITH 18 MG IRON- take 1 twice daily  No gummies    ?? VITAMIN B SUPER-COMPLEX WITH 50 MG THIAMIN (B-1)- take once daily    ?? CALCIUM CITRATE WITH VITAMIN D - 500 MG- take three times a day    ?? B-12 SUBLINGUAL - 500 MCG- take daily        Common Nutritional Problems and Prevention Tips   Nausea and vomiting   Cause: overeating or eating too quickly   Prevention tip: eat slowly, chew your food very well, and stop eating as soon as you feel full   Chronic malnutrition problems   Cause: nutrients are absorbed differently following surgery   Symptoms: fatigue and aching muscles; tingling feet, calves or  hands  Prevention tip: eat a healthy diet and always take your vitamin and mineral supplements   Lactose intolerance   Symptoms: gas, bloating, cramping and diarrhea after drinking milk   Prevention tips: Switch to lactose-free milk such as: Fairlife non-fat milk, unsweetened soy milk, unsweetened almond milk. Refer to lactose-free protein supplement suggestions on pg. 4.  Gas and Bloating  Cause: digestive tract changes after surgery  Prevention tips: eat slowly, avoid overeating, avoid carbonated beverages and drinking through straws. Try switching to lactose-free protein drinks. Try using Gas-X chewables.  Temporary hair loss   Cause: rapid weight loss and/or lack of protein in the diet   Prevention tip: eat the amount of protein recommended by your Registered Dietitian   Dehydration   Cause: Not drinking enough fluids; or persistent vomiting  Symptoms: dark and strong smelling urine, dry mouth, headache and fatigue   Prevention tip: take frequent sips of liquid throughout the day   Dumping syndrome (Gastric Bypass)  Cause: food emptying too quickly from the stomach   Symptoms: diarrhea, nausea, cold sweats and light-headedness   Prevention tips: avoid consuming sugary foods or beverages, drinking fluids too soon after a meal, or eating high fat foods   Constipation   Cause: food and fiber intake are reduced following surgery   Prevention tips:   Drink at least 48 ounces water daily in addition to protein drinks  Exercise daily   Try Miralax (stool softener). No laxatives if possible.  Try a fiber supplement (Metamucil or Benefiber)

## 2023-12-18 ENCOUNTER — PATIENT MESSAGE (OUTPATIENT)
Dept: BARIATRICS | Facility: CLINIC | Age: 73
End: 2023-12-18
Payer: MEDICARE

## 2023-12-22 DIAGNOSIS — F98.8 ATTENTION DEFICIT DISORDER, UNSPECIFIED HYPERACTIVITY PRESENCE: ICD-10-CM

## 2023-12-22 NOTE — TELEPHONE ENCOUNTER
No care due was identified.  [facility] Embedded Care Due Messages. Reference number: [id number].   12/22/2023 4:34:25 PM CST

## 2023-12-26 RX ORDER — DEXMETHYLPHENIDATE HYDROCHLORIDE 5 MG/1
5 TABLET ORAL 2 TIMES DAILY
Qty: 60 TABLET | Refills: 0 | Status: SHIPPED | OUTPATIENT
Start: 2023-12-26 | End: 2024-01-04 | Stop reason: SDUPTHER

## 2023-12-28 ENCOUNTER — DOCUMENTATION ONLY (OUTPATIENT)
Dept: BARIATRICS | Facility: CLINIC | Age: 73
End: 2023-12-28
Payer: MEDICARE

## 2023-12-28 ENCOUNTER — PATIENT MESSAGE (OUTPATIENT)
Dept: BARIATRICS | Facility: CLINIC | Age: 73
End: 2023-12-28
Payer: MEDICARE

## 2024-01-04 ENCOUNTER — OFFICE VISIT (OUTPATIENT)
Dept: PRIMARY CARE CLINIC | Facility: CLINIC | Age: 74
End: 2024-01-04
Payer: MEDICARE

## 2024-01-04 VITALS
SYSTOLIC BLOOD PRESSURE: 120 MMHG | RESPIRATION RATE: 17 BRPM | OXYGEN SATURATION: 95 % | DIASTOLIC BLOOD PRESSURE: 70 MMHG | HEART RATE: 100 BPM | HEIGHT: 62 IN | BODY MASS INDEX: 44.76 KG/M2 | WEIGHT: 243.25 LBS

## 2024-01-04 DIAGNOSIS — Z12.11 COLON CANCER SCREENING: ICD-10-CM

## 2024-01-04 DIAGNOSIS — F98.8 ATTENTION DEFICIT DISORDER, UNSPECIFIED HYPERACTIVITY PRESENCE: ICD-10-CM

## 2024-01-04 DIAGNOSIS — J30.2 SEASONAL ALLERGIES: ICD-10-CM

## 2024-01-04 DIAGNOSIS — Z79.899 CONTROLLED SUBSTANCE AGREEMENT SIGNED: ICD-10-CM

## 2024-01-04 DIAGNOSIS — Z12.31 BREAST CANCER SCREENING BY MAMMOGRAM: Primary | ICD-10-CM

## 2024-01-04 DIAGNOSIS — Z78.0 ASYMPTOMATIC POSTMENOPAUSAL STATE: ICD-10-CM

## 2024-01-04 DIAGNOSIS — J42 CHRONIC BRONCHITIS, UNSPECIFIED CHRONIC BRONCHITIS TYPE: ICD-10-CM

## 2024-01-04 PROCEDURE — 99999 PR PBB SHADOW E&M-EST. PATIENT-LVL V: CPT | Mod: PBBFAC,,, | Performed by: STUDENT IN AN ORGANIZED HEALTH CARE EDUCATION/TRAINING PROGRAM

## 2024-01-04 PROCEDURE — 99214 OFFICE O/P EST MOD 30 MIN: CPT | Mod: S$GLB,,, | Performed by: STUDENT IN AN ORGANIZED HEALTH CARE EDUCATION/TRAINING PROGRAM

## 2024-01-04 RX ORDER — DEXMETHYLPHENIDATE HYDROCHLORIDE 5 MG/1
5 TABLET ORAL 2 TIMES DAILY
Qty: 60 TABLET | Refills: 0 | Status: SHIPPED | OUTPATIENT
Start: 2024-02-23

## 2024-01-04 RX ORDER — LEVOCETIRIZINE DIHYDROCHLORIDE 5 MG/1
5 TABLET, FILM COATED ORAL NIGHTLY PRN
Qty: 30 TABLET | Refills: 11 | Status: SHIPPED | OUTPATIENT
Start: 2024-01-04 | End: 2025-01-03

## 2024-01-04 RX ORDER — DEXMETHYLPHENIDATE HYDROCHLORIDE 5 MG/1
5 TABLET ORAL 2 TIMES DAILY
Qty: 60 TABLET | Refills: 0 | Status: SHIPPED | OUTPATIENT
Start: 2024-03-14

## 2024-01-04 RX ORDER — FLUTICASONE FUROATE, UMECLIDINIUM BROMIDE AND VILANTEROL TRIFENATATE 200; 62.5; 25 UG/1; UG/1; UG/1
1 POWDER RESPIRATORY (INHALATION) DAILY
Qty: 90 EACH | Refills: 3 | Status: SHIPPED | OUTPATIENT
Start: 2024-01-04

## 2024-01-04 RX ORDER — DEXMETHYLPHENIDATE HYDROCHLORIDE 5 MG/1
5 TABLET ORAL 2 TIMES DAILY
Qty: 60 TABLET | Refills: 0 | Status: SHIPPED | OUTPATIENT
Start: 2024-01-24

## 2024-01-04 NOTE — PATIENT INSTRUCTIONS
CBTi from [facility]    Vitamin B12 1,000 - 2,000 mcg daily lozenges or sublingual drops  Also supplement Vitamin D 1,000 - 2,000 IU daily

## 2024-01-04 NOTE — PROGRESS NOTES
"Subjective:       Patient ID: Elizabeth Au is a 73 y.o. female.    Chief Complaint: No chief complaint on file.    HPI:  73 y.o. female presents to [facility] for 11 week follow-up. Requesting Mammogram and bone density scan.    Acute concerns?: Patient reports Acute URI 12/18/2023 that is on the mend and continuing to improve.    Patient needs refill on Trelegy      Currently on Focalin XR 5 mg for ADD  Medication working well?: Yes  Palpitations/chest pain?: No  Appetite change?: No  Anxiety?: No  Insomnia?: Chronic    Difficulty falling asleep?: Yes  Difficulty staying asleep?: No  Bed time routine?: Up watching TV to get sleepy will take gabapentin and melatonin, plays solitaire or mahjong  Attributing concerns?: Mind doesn't stop  Caffeine, coffee, soda, tea?: in morning  Awakens to pee?: No  Snore/Short of breath?: No, had COY test and negative  Number of pillows?: 1  Lights in bed?: No  Phone/TV in bed?: No  Reading in bed?: No  Failed trials?: Melatonin has worked in past. Feels illness has worsened        Review of Systems   Constitutional:  Positive for fatigue (Feels weight related). Negative for chills, diaphoresis and fever.   HENT:  Positive for congestion. Negative for sinus pressure, sneezing and sore throat.    Respiratory:  Positive for cough (In morning, non-bloody if productive). Negative for shortness of breath.    Cardiovascular:  Negative for chest pain and palpitations.   Gastrointestinal:  Negative for abdominal pain, diarrhea, nausea and vomiting.   Skin:  Negative for rash and wound.   Neurological:  Negative for dizziness, light-headedness and headaches.       Objective:      Vitals:    01/04/24 1100   BP: 120/70   BP Location: Right arm   Patient Position: Sitting   BP Method: Medium (Manual)   Pulse: 100   Resp: 17   SpO2: 95%   Weight: 110.3 kg (243 lb 4.4 oz)   Height: 5' 2" (1.575 m)     Physical Exam        Lab Results   Component Value Date     09/21/2023    K 4.1 " "09/21/2023     09/21/2023    CO2 23 09/21/2023    BUN 17 09/21/2023    CREATININE 0.7 09/21/2023    ANIONGAP 10 09/21/2023     Lab Results   Component Value Date    HGBA1C 5.2 09/21/2023     No results found for: "BNP", "BNPTRIAGEBLO"    Lab Results   Component Value Date    WBC 6.72 09/21/2023    HGB 14.1 09/21/2023    HCT 43.0 09/21/2023     09/21/2023    GRAN 3.3 09/21/2023    GRAN 48.7 09/21/2023     Lab Results   Component Value Date    CHOL 164 09/21/2023    HDL 53 09/21/2023    LDLCALC 94.4 09/21/2023    TRIG 83 09/21/2023          Current Outpatient Medications:     albuterol (PROVENTIL/VENTOLIN HFA) 90 mcg/actuation inhaler, , Disp: , Rfl:     atorvastatin (LIPITOR) 10 MG tablet, Take 10 mg by mouth., Disp: , Rfl:     citalopram (CELEXA) 20 MG tablet, Take 20 mg by mouth., Disp: , Rfl:     diclofenac sodium (VOLTAREN ARTHRITIS PAIN) 1 % Gel, Apply 2 g topically once daily., Disp: 100 g, Rfl: 5    fluticasone propionate (FLONASE) 50 mcg/actuation nasal spray, 2 sprays (100 mcg total) by Each Nostril route once daily., Disp: 15.8 mL, Rfl: 5    gabapentin (NEURONTIN) 100 MG capsule, Take by mouth. 200mg in the morning and 300mg at night, Disp: , Rfl:     HYDROcodone-acetaminophen (NORCO) 5-325 mg per tablet, Take 1 tablet by mouth 2 (two) times daily as needed., Disp: , Rfl:     levothyroxine (SYNTHROID) 125 MCG tablet, Take 125 mcg by mouth before breakfast., Disp: , Rfl:     omeprazole (PRILOSEC) 20 MG capsule, Take 20 mg by mouth once daily., Disp: , Rfl:     [START ON 1/24/2024] dexmethylphenidate (FOCALIN) 5 MG tablet, Take 1 tablet (5 mg total) by mouth 2 (two) times daily., Disp: 60 tablet, Rfl: 0    [START ON 2/23/2024] dexmethylphenidate (FOCALIN) 5 MG tablet, Take 1 tablet (5 mg total) by mouth 2 (two) times daily., Disp: 60 tablet, Rfl: 0    [START ON 3/14/2024] dexmethylphenidate (FOCALIN) 5 MG tablet, Take 1 tablet (5 mg total) by mouth 2 (two) times daily., Disp: 60 tablet, Rfl: 0    " levocetirizine (XYZAL) 5 MG tablet, Take 1 tablet (5 mg total) by mouth nightly as needed for Allergies., Disp: 30 tablet, Rfl: 11    TRELEGY ELLIPTA 200-62.5-25 mcg inhaler, Inhale 1 puff into the lungs once daily., Disp: 90 each, Rfl: 3        Assessment:       1. Breast cancer screening by mammogram    2. Colon cancer screening    3. Asymptomatic postmenopausal state    4. Attention deficit disorder, unspecified hyperactivity presence    5. Controlled substance agreement signed    6. Seasonal allergies    7. Chronic bronchitis, unspecified chronic bronchitis type           Plan:       Breast cancer screening by mammogram  -     Mammo Digital Screening Bilat; Future; Expected date: 01/04/2024    Colon cancer screening  -     Cologuard Screening (Multitarget Stool DNA); Future; Expected date: 01/04/2024    Asymptomatic postmenopausal state  -     DXA Bone Density Axial Skeleton 1 or more sites; Future; Expected date: 01/04/2024    Attention deficit disorder, unspecified hyperactivity presence  Controlled substance agreement signed  -     dexmethylphenidate (FOCALIN) 5 MG tablet; Take 1 tablet (5 mg total) by mouth 2 (two) times daily.  Dispense: 60 tablet; Refill: 0  -     dexmethylphenidate (FOCALIN) 5 MG tablet; Take 1 tablet (5 mg total) by mouth 2 (two) times daily.  Dispense: 60 tablet; Refill: 0  -     dexmethylphenidate (FOCALIN) 5 MG tablet; Take 1 tablet (5 mg total) by mouth 2 (two) times daily.  Dispense: 60 tablet; Refill: 0  -      reviewed and appropriate  -     Tolerating medication well at this time    Seasonal allergies  -     levocetirizine (XYZAL) 5 MG tablet; Take 1 tablet (5 mg total) by mouth nightly as needed for Allergies.  Dispense: 30 tablet; Refill: 11    Chronic bronchitis, unspecified chronic bronchitis type  -     TRELEGY ELLIPTA 200-62.5-25 mcg inhaler; Inhale 1 puff into the lungs once daily.  Dispense: 90 each; Refill: 3    RTC in 3 months  Hipolito Arboleda MD

## 2024-01-11 DIAGNOSIS — Z00.00 ENCOUNTER FOR MEDICARE ANNUAL WELLNESS EXAM: ICD-10-CM

## 2024-01-12 ENCOUNTER — TELEPHONE (OUTPATIENT)
Dept: PRIMARY CARE CLINIC | Facility: CLINIC | Age: 74
End: 2024-01-12
Payer: MEDICARE

## 2024-01-12 NOTE — TELEPHONE ENCOUNTER
----- Message from Sol Truong sent at 1/12/2024  2:09 PM CST -----  Contact: [phone]  Caller is requesting to schedule their annual screening mammogram appointment.  Please enter order and contact patient to schedule. Pt called to advise that the facility she has her mammogram done at does not have her order for her mammogram. She called to give the fax # to where the order can be sent. Fax # [phone]. Please Advise

## 2024-01-29 LAB — BCS RECOMMENDATION EXT: NORMAL

## 2024-02-05 ENCOUNTER — PATIENT MESSAGE (OUTPATIENT)
Dept: ADMINISTRATIVE | Facility: HOSPITAL | Age: 74
End: 2024-02-05
Payer: MEDICARE

## 2024-02-05 ENCOUNTER — PATIENT OUTREACH (OUTPATIENT)
Dept: ADMINISTRATIVE | Facility: HOSPITAL | Age: 74
End: 2024-02-05
Payer: MEDICARE

## 2024-02-05 NOTE — PROGRESS NOTES
Health Maintenance Due   Topic Date Due    Hepatitis C Screening  Never done    DEXA Scan  Never done    Colorectal Cancer Screening  Never done    RSV Vaccine (Age 60+ and Pregnant patients) (1 - 1-dose 60+ series) Never done     Population Health Chart Review & Patient Outreach Details      Further Action Needed If Patient Returns Outreach:      Received mammogram report done 2024, uploaded.  updated.   Cologuard Kit ordered  by PCP. Portal message sent too patient reminding her to mail in if she has not already done so.       Updates Requested / Reviewed:     [x]  Care Everywhere    []     []  External Sources ([facility], [facility], [facility], etc.)    [] [facility]   [] [facility]   [] Other:    [x]  Care Team Updated   []  Removed  or Duplicate Orders   [x]  Immunization Reconciliation Completed / Queried    [x] [place]   [] [place]   [] [place]   [] [place]      Health Maintenance Topics Addressed and Outreach Outcomes / Actions Taken:             Breast Cancer Screening []  Mammogram Order Placed    []  Mammogram Screening Scheduled    []  External Records Requested & Care Team Updated if Applicable    [x]  External Records Uploaded & Care Team Updated if Applicable    []  Pt Declined Scheduling Mammogram    []  Pt Will Schedule with External Provider / Order Routed & Care Team Updated if Applicable              Cervical Cancer Screening []  Pap Smear Scheduled in Primary Care or OBGYN    []  External Records Requested & Care Team Updated if Applicable       []  External Records Uploaded, Care Team Updated, & History Updated if Applicable    []  Patient Declined Scheduling Pap Smear    []  Patient Will Schedule with External Provider & Care Team Updated if Applicable                  Colorectal Cancer Screening [x]  Colonoscopy Case Request / Referral / Home Test Order Placed    []  External Records Requested & Care Team Updated if Applicable    []  External Records Uploaded, Care Team  Updated, & History Updated if Applicable    []  Patient Declined Completing Colon Cancer Screening    []  Patient Will Schedule with External Provider & Care Team Updated if Applicable    []  Fit Kit Mailed (add the SmartPhrase under additional notes)    [x]  Reminded Patient to Complete Home Test                Diabetic Eye Exam []  Eye Exam Screening Order Placed    []  Eye Camera Scheduled or Optometry/Ophthalmology Referral Placed    []  External Records Requested & Care Team Updated if Applicable    []  External Records Uploaded, Care Team Updated, & History Updated if Applicable    []  Patient Declined Scheduling Eye Exam    []  Patient Will Schedule with External Provider & Care Team Updated if Applicable             Blood Pressure Control []  Primary Care Follow Up Visit Scheduled     []  Remote Blood Pressure Reading Captured    []  Patient Declined Remote Reading or Scheduling Appt - Escalated to PCP    []  Patient Will Call Back or Send Portal Message with Reading                 HbA1c & Other Labs []  Overdue Lab(s) Ordered    []  Overdue Lab(s) Scheduled    []  External Records Uploaded & Care Team Updated if Applicable    []  Primary Care Follow Up Visit Scheduled     []  Reminded Patient to Complete A1c Home Test    []  Patient Declined Scheduling Labs or Will Call Back to Schedule    []  Patient Will Schedule with External Provider / Order Routed, & Care Team Updated if Applicable           Primary Care Appointment []  Primary Care Appt Scheduled    []  Patient Declined Scheduling or Will Call Back to Schedule    []  Pt Established with External Provider, Updated Care Team, & Informed Pt to Notify Payor if Applicable           Medication Adherence /    Statin Use []  Primary Care Appointment Scheduled    []  Patient Reminded to  Prescription    []  Patient Declined, Provider Notified if Needed    []  Sent Provider Message to Review to Evaluate Pt for Statin, Add Exclusion Dx Codes, Document    Exclusion in Problem List, Change Statin Intensity Level to Moderate or High Intensity if Applicable                Osteoporosis Screening []  Dexa Order Placed    []  Dexa Appointment Scheduled    []  External Records Requested & Care Team Updated    []  External Records Uploaded, Care Team Updated, & History Updated if Applicable    []  Patient Declined Scheduling Dexa or Will Call Back to Schedule    []  Patient Will Schedule with External Provider / Order Routed & Care Team Updated if Applicable       Additional Notes:              [FreeTextEntry1] : 18 year old boy with autism, intellectual disability, and 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  He has focal impaired awareness seizures that sometimes generalized to bilateral tonic clonic seizures. \par \par Interim: patient has been doing better since VNS placement. He had a cluster of seizures (3-4 in total) that happened over the weekend. They only notice a couch or hoarse voice when they use the magnet; he is otherwise not showing any other side effects from the VNS.\par \par Seizure History: Seizures started 2015. At the start of his seizures, he has an aura that causes him to go to his parents room to alert them sometimes is wrong. It is unclear what he feels (he has limited vocabulary) but has both his hands covering his ears.  He will then stare and be semi-responsive, or have repetitive stereotypic movements such as rocking back and forth (which is unusual for him), or repeat a single word over and over. Then he stares off and this is when parents say he loses alertness. After about 30 seconds to one minute, his eyes and head will turn to the left or to the right, and he can have lip twitching on the same side, and the seizure generalizes to a tonic clonic seizure. His hands occasionally will turn pale.\par \par Seizure frequency hx: About 4x per month. Seizures typically last about 5 minutes- the behavioral arrest and head turn is typically about 2-3 minutes, with the generalized phase lasting less than 2 minutes. Seizures most commonly occur in the evening after 6pm.\par \par Multiple videos reviewed:\par - Video starts a few minutes into the seizure, patient is laying on his stomach with head turned to the right, his are looking straight forward and per parents, his body is stiff, he is also drooling, 2 minutes into the video his eyes deviated to the right and he has upper lip twitching with clonic movements of the head. \par - Video of one seizure which was just a prolonged staring episode with eyes looking forward and mouth half open, then after a minute his head turn to the left with his body getting stiff\par - Video of behavioral arrest with no generalization\par \par Other possible seizure types:\par - Tonic seizure in sleep (both arms extended and straight, head was deviated and he turned off bed)\par - Staring spells that last less than 30 seconds, immediately followed by sleep.\par \par AEEG (03/25/2022):occasional multifocal spikes, brief generalized paroxysmal fast activity and rare slow spike and wave complexes with moderate slowing. \par \par MEDS:\par - Briviact  (100mg) in am - 4 (100mg) in p.m\par - Trileptal 1 600mg tab and 1.5 300mg tabs BID (1050mg BID)\par - VNS was placed on 05/06/2022\par \par Behavior\par - Risperidone 2.5 mg QAM\par - Guanfacine ER 4 mg QHS\par - Clonazepam\par \par Gets OT, ST, has 1:1 at school. Currently going to summer school.\par Has limited vocabulary, maybe 20 words.\par Walks well.\par \par MED History: \par Keppra [id number]\par Trileptal (May 2019 - current) - seizures on max dose 1050 bid; but [facility] requesting to continue for mood\par Vimpat - added March 2021; D/Cd April 2021 due to out of pocket cost\par Zonisamide - started April 2021 - D/Cd a month later due to reported side effects (lethargy, drooling, more anxiety)\par Topamax - added April 2021; failed max dose of 200 mg b.i.d; dose lowered to 100mg BID Feb 2022, weaned off in May \par \par Other specialties: He is followed by [facility] for behaviors (Dr. Gilliam, [facility] Peds, requested in Nov 2021, to try and keep him on Trileptal if possible, as a mood stabilizer). Patient is followed by Dr. Ga and referred to epilepsy clinic due to refractory epilepsy. He usually goes to sleep at 930pm and wakes up at 630am. There is no history of snoring, night time arousals.

## 2024-02-21 LAB — NONINV COLON CA DNA+OCC BLD SCRN STL QL: NEGATIVE

## 2024-02-27 DIAGNOSIS — F98.8 ATTENTION DEFICIT DISORDER, UNSPECIFIED HYPERACTIVITY PRESENCE: ICD-10-CM

## 2024-02-28 RX ORDER — DEXMETHYLPHENIDATE HYDROCHLORIDE 5 MG/1
5 TABLET ORAL 2 TIMES DAILY
Qty: 60 TABLET | Refills: 0 | Status: SHIPPED | OUTPATIENT
Start: 2024-02-28 | End: 2024-05-09 | Stop reason: SDUPTHER

## 2024-02-28 NOTE — TELEPHONE ENCOUNTER
No care due was identified.  Health [facility] Embedded Care Due Messages. Reference number: [id number].   2/27/2024 10:44:01 PM CST

## 2024-03-08 ENCOUNTER — PATIENT MESSAGE (OUTPATIENT)
Dept: PRIMARY CARE CLINIC | Facility: CLINIC | Age: 74
End: 2024-03-08
Payer: MEDICARE

## 2024-04-04 ENCOUNTER — OFFICE VISIT (OUTPATIENT)
Dept: PRIMARY CARE CLINIC | Facility: CLINIC | Age: 74
End: 2024-04-04
Payer: MEDICARE

## 2024-04-04 VITALS
RESPIRATION RATE: 18 BRPM | SYSTOLIC BLOOD PRESSURE: 118 MMHG | OXYGEN SATURATION: 96 % | WEIGHT: 249.88 LBS | BODY MASS INDEX: 45.98 KG/M2 | DIASTOLIC BLOOD PRESSURE: 76 MMHG | TEMPERATURE: 97 F | HEART RATE: 76 BPM | HEIGHT: 62 IN

## 2024-04-04 DIAGNOSIS — G47.00 INSOMNIA, UNSPECIFIED TYPE: Primary | ICD-10-CM

## 2024-04-04 DIAGNOSIS — F32.A DEPRESSION, UNSPECIFIED DEPRESSION TYPE: ICD-10-CM

## 2024-04-04 DIAGNOSIS — F98.8 ATTENTION DEFICIT DISORDER, UNSPECIFIED HYPERACTIVITY PRESENCE: ICD-10-CM

## 2024-04-04 PROCEDURE — 1125F AMNT PAIN NOTED PAIN PRSNT: CPT | Mod: CPTII,S$GLB,, | Performed by: STUDENT IN AN ORGANIZED HEALTH CARE EDUCATION/TRAINING PROGRAM

## 2024-04-04 PROCEDURE — 3074F SYST BP LT 130 MM HG: CPT | Mod: CPTII,S$GLB,, | Performed by: STUDENT IN AN ORGANIZED HEALTH CARE EDUCATION/TRAINING PROGRAM

## 2024-04-04 PROCEDURE — 3288F FALL RISK ASSESSMENT DOCD: CPT | Mod: CPTII,S$GLB,, | Performed by: STUDENT IN AN ORGANIZED HEALTH CARE EDUCATION/TRAINING PROGRAM

## 2024-04-04 PROCEDURE — 99214 OFFICE O/P EST MOD 30 MIN: CPT | Mod: S$GLB,,, | Performed by: STUDENT IN AN ORGANIZED HEALTH CARE EDUCATION/TRAINING PROGRAM

## 2024-04-04 PROCEDURE — 3078F DIAST BP <80 MM HG: CPT | Mod: CPTII,S$GLB,, | Performed by: STUDENT IN AN ORGANIZED HEALTH CARE EDUCATION/TRAINING PROGRAM

## 2024-04-04 PROCEDURE — 3008F BODY MASS INDEX DOCD: CPT | Mod: CPTII,S$GLB,, | Performed by: STUDENT IN AN ORGANIZED HEALTH CARE EDUCATION/TRAINING PROGRAM

## 2024-04-04 PROCEDURE — 99999 PR PBB SHADOW E&M-EST. PATIENT-LVL V: CPT | Mod: PBBFAC,,, | Performed by: STUDENT IN AN ORGANIZED HEALTH CARE EDUCATION/TRAINING PROGRAM

## 2024-04-04 PROCEDURE — 1159F MED LIST DOCD IN RCRD: CPT | Mod: CPTII,S$GLB,, | Performed by: STUDENT IN AN ORGANIZED HEALTH CARE EDUCATION/TRAINING PROGRAM

## 2024-04-04 PROCEDURE — 1101F PT FALLS ASSESS-DOCD LE1/YR: CPT | Mod: CPTII,S$GLB,, | Performed by: STUDENT IN AN ORGANIZED HEALTH CARE EDUCATION/TRAINING PROGRAM

## 2024-04-04 RX ORDER — DEXMETHYLPHENIDATE HYDROCHLORIDE 5 MG/1
5 TABLET ORAL 2 TIMES DAILY
Qty: 60 TABLET | Refills: 0 | Status: CANCELLED | OUTPATIENT
Start: 2024-06-01

## 2024-04-04 RX ORDER — TRAZODONE HYDROCHLORIDE 50 MG/1
25-50 TABLET ORAL NIGHTLY PRN
Qty: 30 TABLET | Refills: 11 | Status: SHIPPED | OUTPATIENT
Start: 2024-04-04 | End: 2024-05-02

## 2024-04-04 RX ORDER — DEXMETHYLPHENIDATE HYDROCHLORIDE 5 MG/1
5 TABLET ORAL 2 TIMES DAILY
Qty: 60 TABLET | Refills: 0 | Status: CANCELLED | OUTPATIENT
Start: 2024-05-02

## 2024-04-04 RX ORDER — DEXMETHYLPHENIDATE HYDROCHLORIDE 5 MG/1
5 TABLET ORAL 2 TIMES DAILY
Qty: 60 TABLET | Refills: 0 | Status: CANCELLED | OUTPATIENT
Start: 2024-04-04

## 2024-04-04 RX ORDER — ATOMOXETINE 40 MG/1
40 CAPSULE ORAL DAILY
Qty: 30 CAPSULE | Refills: 1 | Status: SHIPPED | OUTPATIENT
Start: 2024-04-04 | End: 2024-05-09

## 2024-04-04 NOTE — PROGRESS NOTES
Subjective:       Patient ID: Elizabeth Au is a 73 y.o. female.    Chief Complaint: Follow-up (3 month appt. )    HPI:  73 y.o. female presents to [facility] for routine follow-up    Acute concerns?: Concerns for insomnia    Difficulty falling asleep?: Yes  Difficulty staying asleep?: No  Bed time routine?: Patient will stay up until 4-5 am, takes gabapentin and allergy medication. Occasional hydrocodone if in pain  Attributing concerns?: Racing thoughts  Caffeine, coffee, soda, tea?: Is on Focalin, rare later coke  Awakens to pee?: Yes  Snoring/Short of breath?: No  Negative COY test in past  Number of pillows?: 1  Lights in bed?: No  Phone/TV in bed?: No  Reading in bed?: No  Failed trials?: melatonin, CBT    Currently on Focalin 5 mg daily for ADD. Last refill 2/28/2024  Medication working well?: Yes, would like to attempt Strattera with stimulant shortage  Palpitations/chest pain?: No  Appetite change?: No  Anxiety?: Has with sleep  Insomnia?: Yes    Recent stressors?: Retired and at home  Exercise?: No regular  Meditation?: Doesn't  Therapist?: No desire. Wants to try sleep first  Treated in past?: Yes  Failed trials?: Prozac, currently on Celexa 20 mg    Review of Systems   Constitutional:  Positive for fatigue (When allergies are active). Negative for chills, diaphoresis and fever.   Respiratory:  Negative for chest tightness and shortness of breath.    Cardiovascular:  Positive for palpitations (If not sleeping well and fatigued). Negative for chest pain.   Skin:  Negative for rash and wound.   Psychiatric/Behavioral:  Positive for dysphoric mood (Feels may be related to sleep loss and less exercise. Present for 1 month) and sleep disturbance. Negative for decreased concentration.        Objective:      Vitals:    04/04/24 1110   BP: 118/76   BP Location: Left arm   Patient Position: Sitting   BP Method: Large (Manual)   Pulse: 76   Resp: 18   Temp: 97.2 °F (36.2 °C)   TempSrc: Temporal   SpO2: 96%  "  Weight: 113.3 kg (249 lb 14.3 oz)   Height: 5' 2" (1.575 m)     Physical Exam  Vitals reviewed.   Constitutional:       General: She is not in acute distress.     Appearance: Normal appearance. She is not ill-appearing.   HENT:      Head: Normocephalic and atraumatic.   Eyes:      General:         Right eye: No discharge.         Left eye: No discharge.      Conjunctiva/sclera: Conjunctivae normal.   Cardiovascular:      Rate and Rhythm: Normal rate.   Pulmonary:      Effort: Pulmonary effort is normal.   Musculoskeletal:         General: No deformity.   Skin:     Coloration: Skin is not jaundiced or pale.   Neurological:      General: No focal deficit present.      Mental Status: She is alert and oriented to person, place, and time.   Psychiatric:         Mood and Affect: Mood normal.         Behavior: Behavior normal.             Lab Results   Component Value Date     09/21/2023    K 4.1 09/21/2023     09/21/2023    CO2 23 09/21/2023    BUN 17 09/21/2023    CREATININE 0.7 09/21/2023    ANIONGAP 10 09/21/2023     Lab Results   Component Value Date    HGBA1C 5.2 09/21/2023     No results found for: "BNP", "BNPTRIAGEBLO"    Lab Results   Component Value Date    WBC 6.72 09/21/2023    HGB 14.1 09/21/2023    HCT 43.0 09/21/2023     09/21/2023    GRAN 3.3 09/21/2023    GRAN 48.7 09/21/2023     Lab Results   Component Value Date    CHOL 164 09/21/2023    HDL 53 09/21/2023    LDLCALC 94.4 09/21/2023    TRIG 83 09/21/2023          Current Outpatient Medications:     albuterol (PROVENTIL/VENTOLIN HFA) 90 mcg/actuation inhaler, , Disp: , Rfl:     atorvastatin (LIPITOR) 10 MG tablet, Take 10 mg by mouth., Disp: , Rfl:     citalopram (CELEXA) 20 MG tablet, Take 20 mg by mouth., Disp: , Rfl:     dexmethylphenidate (FOCALIN) 5 MG tablet, Take 1 tablet (5 mg total) by mouth 2 (two) times daily., Disp: 60 tablet, Rfl: 0    dexmethylphenidate (FOCALIN) 5 MG tablet, Take 1 tablet (5 mg total) by mouth 2 (two) " times daily., Disp: 60 tablet, Rfl: 0    dexmethylphenidate (FOCALIN) 5 MG tablet, Take 1 tablet (5 mg total) by mouth 2 (two) times daily., Disp: 60 tablet, Rfl: 0    diclofenac sodium (VOLTAREN ARTHRITIS PAIN) 1 % Gel, Apply 2 g topically once daily., Disp: 100 g, Rfl: 5    fluticasone propionate (FLONASE) 50 mcg/actuation nasal spray, 2 sprays (100 mcg total) by Each Nostril route once daily., Disp: 15.8 mL, Rfl: 5    gabapentin (NEURONTIN) 100 MG capsule, Take by mouth. 200mg in the morning and 300mg at night, Disp: , Rfl:     HYDROcodone-acetaminophen (NORCO) 5-325 mg per tablet, Take 1 tablet by mouth 2 (two) times daily as needed., Disp: , Rfl:     levocetirizine (XYZAL) 5 MG tablet, Take 1 tablet (5 mg total) by mouth nightly as needed for Allergies., Disp: 30 tablet, Rfl: 11    levothyroxine (SYNTHROID) 125 MCG tablet, Take 125 mcg by mouth before breakfast., Disp: , Rfl:     omeprazole (PRILOSEC) 20 MG capsule, Take 20 mg by mouth once daily., Disp: , Rfl:     TRELEGY ELLIPTA 200-62.5-25 mcg inhaler, Inhale 1 puff into the lungs once daily., Disp: 90 each, Rfl: 3    atomoxetine (STRATTERA) 40 MG capsule, Take 1 capsule (40 mg total) by mouth once daily., Disp: 30 capsule, Rfl: 1    traZODone (DESYREL) 50 MG tablet, Take 0.5-1 tablets (25-50 mg total) by mouth nightly as needed for Insomnia., Disp: 30 tablet, Rfl: 11        Assessment:       1. Insomnia, unspecified type    2. Attention deficit disorder, unspecified hyperactivity presence    3. Depression, unspecified depression type           Plan:       Insomnia, unspecified type  Attention deficit disorder, unspecified hyperactivity presence  Depression, unspecified depression type  -     atomoxetine (STRATTERA) 40 MG capsule; Take 1 capsule (40 mg total) by mouth once daily.  Dispense: 30 capsule; Refill: 1  -     traZODone (DESYREL) 50 MG tablet; Take 0.5-1 tablets (25-50 mg total) by mouth nightly as needed for Insomnia.  Dispense: 30 tablet; Refill:  11  - Will re-address concerns with depression and insomnia in 4 weeks  - Will assess efficacy of Strattera in 4 weeks  - Conservative measures discussed today. Recommend routine exercise, daily meditation, and breathing exercises.    RTC in 4 weeks

## 2024-04-05 DIAGNOSIS — J42 CHRONIC BRONCHITIS, UNSPECIFIED CHRONIC BRONCHITIS TYPE: ICD-10-CM

## 2024-04-05 NOTE — TELEPHONE ENCOUNTER
No care due was identified.  [facility] Embedded Care Due Messages. Reference number: [id number].   4/05/2024 2:55:30 PM CDT

## 2024-04-06 RX ORDER — FLUTICASONE FUROATE, UMECLIDINIUM BROMIDE AND VILANTEROL TRIFENATATE 200; 62.5; 25 UG/1; UG/1; UG/1
1 POWDER RESPIRATORY (INHALATION) DAILY
Qty: 180 EACH | Refills: 3 | Status: SHIPPED | OUTPATIENT
Start: 2024-04-06

## 2024-04-06 NOTE — TELEPHONE ENCOUNTER
Refill Decision Note   Elizabeth Au  is requesting a refill authorization.  Brief Assessment and Rationale for Refill:  Approve     Medication Therapy Plan:        Comments:     Note composed:5:07 PM 04/06/2024

## 2024-05-02 ENCOUNTER — OFFICE VISIT (OUTPATIENT)
Dept: PRIMARY CARE CLINIC | Facility: CLINIC | Age: 74
End: 2024-05-02
Payer: MEDICARE

## 2024-05-02 DIAGNOSIS — F98.8 ATTENTION DEFICIT DISORDER, UNSPECIFIED HYPERACTIVITY PRESENCE: Primary | ICD-10-CM

## 2024-05-02 DIAGNOSIS — G47.00 INSOMNIA, UNSPECIFIED TYPE: ICD-10-CM

## 2024-05-02 PROCEDURE — 1160F RVW MEDS BY RX/DR IN RCRD: CPT | Mod: CPTII,95,, | Performed by: STUDENT IN AN ORGANIZED HEALTH CARE EDUCATION/TRAINING PROGRAM

## 2024-05-02 PROCEDURE — 1159F MED LIST DOCD IN RCRD: CPT | Mod: CPTII,95,, | Performed by: STUDENT IN AN ORGANIZED HEALTH CARE EDUCATION/TRAINING PROGRAM

## 2024-05-02 PROCEDURE — 99213 OFFICE O/P EST LOW 20 MIN: CPT | Mod: 95,,, | Performed by: STUDENT IN AN ORGANIZED HEALTH CARE EDUCATION/TRAINING PROGRAM

## 2024-05-02 NOTE — PROGRESS NOTES
The patient location is: [place]  The chief complaint leading to consultation is: F/u Strattera    Visit type: audiovisual    Face to Face time with patient: 7 minutes  9 minutes of total time spent on the encounter, which includes face to face time and non-face to face time preparing to see the patient (eg, review of tests), Obtaining and/or reviewing separately obtained history, Documenting clinical information in the electronic or other health record, Independently interpreting results (not separately reported) and communicating results to the patient/family/caregiver, or Care coordination (not separately reported).         Each patient to whom he or she provides medical services by telemedicine is:  (1) informed of the relationship between the physician and patient and the respective role of any other health care provider with respect to management of the patient; and (2) notified that he or she may decline to receive medical services by telemedicine and may withdraw from such care at any time.    Notes:     HPI:  Patient here to follow-up Strattera for ADD    Acute concerns?: Patient reports experiencing some fatigue since illness last week.    Stopped trazodone with hangover day following. If exercising will sleep better at night.    Currently on Strattera 40 mg for ADD  Medication working well?: Hasn't started Strattera, wanted to wash trazodone out  Chest pain/Palpitations?: No  Appetite change?: No  Anxiety?: No  Insomnia?: No    Physical Exam  Constitutional:       General: She is not in acute distress.     Appearance: Normal appearance. She is not ill-appearing or diaphoretic.   HENT:      Head: Normocephalic and atraumatic.   Eyes:      General:         Right eye: No discharge.         Left eye: No discharge.      Conjunctiva/sclera: Conjunctivae normal.   Pulmonary:      Effort: Pulmonary effort is normal.   Musculoskeletal:         General: No deformity.      Cervical back: Neck supple. No rigidity.    Skin:     Coloration: Skin is not pale.   Neurological:      Mental Status: She is alert.   Psychiatric:         Mood and Affect: Mood normal.         Behavior: Behavior normal.         Thought Content: Thought content normal.         Judgment: Judgment normal.           Plan:  Attention deficit disorder, unspecified hyperactivity presence  Insomnia, unspecified type  - Will begin Strattera now and f/u in 4 weeks for efficacy  - Agree with increased daily activity to improve sleep

## 2024-05-07 ENCOUNTER — PATIENT MESSAGE (OUTPATIENT)
Dept: PRIMARY CARE CLINIC | Facility: CLINIC | Age: 74
End: 2024-05-07
Payer: MEDICARE

## 2024-05-09 DIAGNOSIS — F98.8 ATTENTION DEFICIT DISORDER, UNSPECIFIED HYPERACTIVITY PRESENCE: ICD-10-CM

## 2024-05-09 RX ORDER — DEXMETHYLPHENIDATE HYDROCHLORIDE 5 MG/1
5 TABLET ORAL 2 TIMES DAILY
Qty: 60 TABLET | Refills: 0 | Status: SHIPPED | OUTPATIENT
Start: 2024-07-06

## 2024-05-09 RX ORDER — DEXMETHYLPHENIDATE HYDROCHLORIDE 5 MG/1
5 TABLET ORAL 2 TIMES DAILY
Qty: 60 TABLET | Refills: 0 | Status: SHIPPED | OUTPATIENT
Start: 2024-05-09

## 2024-05-09 RX ORDER — DEXMETHYLPHENIDATE HYDROCHLORIDE 5 MG/1
5 TABLET ORAL 2 TIMES DAILY
Qty: 60 TABLET | Refills: 0 | Status: SHIPPED | OUTPATIENT
Start: 2024-06-06

## 2024-05-09 NOTE — TELEPHONE ENCOUNTER
No care due was identified.  Health [facility] Embedded Care Due Messages. Reference number: [id number].   5/09/2024 11:04:47 AM CDT

## 2024-05-23 ENCOUNTER — PATIENT MESSAGE (OUTPATIENT)
Dept: PRIMARY CARE CLINIC | Facility: CLINIC | Age: 74
End: 2024-05-23
Payer: MEDICARE

## 2024-05-23 DIAGNOSIS — K29.60 REFLUX GASTRITIS: Primary | ICD-10-CM

## 2024-05-23 DIAGNOSIS — J42 CHRONIC BRONCHITIS, UNSPECIFIED CHRONIC BRONCHITIS TYPE: Primary | ICD-10-CM

## 2024-05-23 RX ORDER — OMEPRAZOLE 20 MG/1
20 CAPSULE, DELAYED RELEASE ORAL DAILY
Qty: 90 CAPSULE | Refills: 3 | Status: SHIPPED | OUTPATIENT
Start: 2024-05-23

## 2024-05-23 RX ORDER — ALBUTEROL SULFATE 90 UG/1
2 AEROSOL, METERED RESPIRATORY (INHALATION)
Qty: 18 G | Refills: 5 | Status: SHIPPED | OUTPATIENT
Start: 2024-05-23

## 2024-05-23 NOTE — TELEPHONE ENCOUNTER
No care due was identified.  [facility] Embedded Care Due Messages. Reference number: [id number].   5/23/2024 11:52:38 AM CDT

## 2024-08-05 DIAGNOSIS — F98.8 ATTENTION DEFICIT DISORDER, UNSPECIFIED HYPERACTIVITY PRESENCE: ICD-10-CM

## 2024-08-05 RX ORDER — DEXMETHYLPHENIDATE HYDROCHLORIDE 5 MG/1
5 TABLET ORAL 2 TIMES DAILY
Qty: 60 TABLET | Refills: 0 | OUTPATIENT
Start: 2024-08-05

## 2024-08-07 ENCOUNTER — TELEPHONE (OUTPATIENT)
Dept: PRIMARY CARE CLINIC | Facility: CLINIC | Age: 74
End: 2024-08-07
Payer: MEDICARE

## 2024-08-12 ENCOUNTER — OFFICE VISIT (OUTPATIENT)
Dept: PRIMARY CARE CLINIC | Facility: CLINIC | Age: 74
End: 2024-08-12
Payer: MEDICARE

## 2024-08-12 VITALS
HEART RATE: 81 BPM | WEIGHT: 254.19 LBS | DIASTOLIC BLOOD PRESSURE: 78 MMHG | OXYGEN SATURATION: 96 % | SYSTOLIC BLOOD PRESSURE: 122 MMHG | BODY MASS INDEX: 46.78 KG/M2 | RESPIRATION RATE: 18 BRPM | HEIGHT: 62 IN

## 2024-08-12 DIAGNOSIS — M25.561 CHRONIC PAIN OF BOTH KNEES: ICD-10-CM

## 2024-08-12 DIAGNOSIS — Z11.59 NEED FOR HEPATITIS C SCREENING TEST: ICD-10-CM

## 2024-08-12 DIAGNOSIS — G89.29 CHRONIC PAIN OF BOTH KNEES: ICD-10-CM

## 2024-08-12 DIAGNOSIS — M25.562 CHRONIC PAIN OF BOTH KNEES: ICD-10-CM

## 2024-08-12 DIAGNOSIS — Z01.818 PRE-OP EXAM: Primary | ICD-10-CM

## 2024-08-12 DIAGNOSIS — H26.9 CATARACT OF RIGHT EYE, UNSPECIFIED CATARACT TYPE: ICD-10-CM

## 2024-08-12 DIAGNOSIS — F98.8 ATTENTION DEFICIT DISORDER, UNSPECIFIED HYPERACTIVITY PRESENCE: ICD-10-CM

## 2024-08-12 PROCEDURE — 1101F PT FALLS ASSESS-DOCD LE1/YR: CPT | Mod: CPTII,S$GLB,, | Performed by: STUDENT IN AN ORGANIZED HEALTH CARE EDUCATION/TRAINING PROGRAM

## 2024-08-12 PROCEDURE — 3078F DIAST BP <80 MM HG: CPT | Mod: CPTII,S$GLB,, | Performed by: STUDENT IN AN ORGANIZED HEALTH CARE EDUCATION/TRAINING PROGRAM

## 2024-08-12 PROCEDURE — 3074F SYST BP LT 130 MM HG: CPT | Mod: CPTII,S$GLB,, | Performed by: STUDENT IN AN ORGANIZED HEALTH CARE EDUCATION/TRAINING PROGRAM

## 2024-08-12 PROCEDURE — 1159F MED LIST DOCD IN RCRD: CPT | Mod: CPTII,S$GLB,, | Performed by: STUDENT IN AN ORGANIZED HEALTH CARE EDUCATION/TRAINING PROGRAM

## 2024-08-12 PROCEDURE — 3008F BODY MASS INDEX DOCD: CPT | Mod: CPTII,S$GLB,, | Performed by: STUDENT IN AN ORGANIZED HEALTH CARE EDUCATION/TRAINING PROGRAM

## 2024-08-12 PROCEDURE — 1160F RVW MEDS BY RX/DR IN RCRD: CPT | Mod: CPTII,S$GLB,, | Performed by: STUDENT IN AN ORGANIZED HEALTH CARE EDUCATION/TRAINING PROGRAM

## 2024-08-12 PROCEDURE — 3288F FALL RISK ASSESSMENT DOCD: CPT | Mod: CPTII,S$GLB,, | Performed by: STUDENT IN AN ORGANIZED HEALTH CARE EDUCATION/TRAINING PROGRAM

## 2024-08-12 PROCEDURE — 99999 PR PBB SHADOW E&M-EST. PATIENT-LVL IV: CPT | Mod: PBBFAC,,, | Performed by: STUDENT IN AN ORGANIZED HEALTH CARE EDUCATION/TRAINING PROGRAM

## 2024-08-12 PROCEDURE — 1126F AMNT PAIN NOTED NONE PRSNT: CPT | Mod: CPTII,S$GLB,, | Performed by: STUDENT IN AN ORGANIZED HEALTH CARE EDUCATION/TRAINING PROGRAM

## 2024-08-12 PROCEDURE — 99214 OFFICE O/P EST MOD 30 MIN: CPT | Mod: S$GLB,,, | Performed by: STUDENT IN AN ORGANIZED HEALTH CARE EDUCATION/TRAINING PROGRAM

## 2024-08-12 RX ORDER — DEXMETHYLPHENIDATE HYDROCHLORIDE 5 MG/1
5 TABLET ORAL 2 TIMES DAILY
Qty: 60 TABLET | Refills: 0 | Status: SHIPPED | OUTPATIENT
Start: 2024-08-12

## 2024-08-12 RX ORDER — DEXMETHYLPHENIDATE HYDROCHLORIDE 5 MG/1
5 TABLET ORAL 2 TIMES DAILY
Qty: 60 TABLET | Refills: 0 | Status: SHIPPED | OUTPATIENT
Start: 2024-09-09

## 2024-08-12 RX ORDER — BROMFENAC SODIUM 0.7 MG/ML
0.07 SOLUTION/ DROPS OPHTHALMIC DAILY
COMMUNITY

## 2024-08-12 RX ORDER — CIPROFLOXACIN HYDROCHLORIDE 3 MG/ML
1 SOLUTION/ DROPS OPHTHALMIC
COMMUNITY
Start: 2024-06-03

## 2024-08-12 RX ORDER — DEXMETHYLPHENIDATE HYDROCHLORIDE 5 MG/1
5 TABLET ORAL 2 TIMES DAILY
Qty: 60 TABLET | Refills: 0 | Status: SHIPPED | OUTPATIENT
Start: 2024-10-09

## 2024-08-12 RX ORDER — HYDROCODONE BITARTRATE AND ACETAMINOPHEN 5; 325 MG/1; MG/1
1 TABLET ORAL EVERY 8 HOURS PRN
Qty: 18 TABLET | Refills: 0 | Status: SHIPPED | OUTPATIENT
Start: 2024-08-12

## 2024-08-12 NOTE — PROGRESS NOTES
"Subjective:       Patient ID: Elizabeth Au is a 74 y.o. female.    Chief Complaint: Pre-op Exam and Medication Refill      HPI:  74 y.o. female presents to [facility] for surgery clearance    Will have right cataract extraction 8/15/2024 through [facility]    Acute concerns?: None currently      Any prior reaction to anaesthesia: No  History of prolonged bleeding after surgery or injury?: No  High risk surgery?: No  History of MI, abnormal stress, anginal chest pain, stent, NTG use?: None  History of CHF?: No  History of TIA or stroke?: No  On insulin?: No  Pre-op Cr >2 mg/dL?:      Currently on Focalin 5 mg for ADD. Last refilled 5/9/2024  Medication working well?: Yes  Chest pain/Palpitations?: No, can get if loses sleep too long. Can occur often. Had Holter monitor to evaluate and looks OK  Appetite change?: No  Anxiety?: No  Insomnia?: No      Review of Systems   Constitutional:  Negative for chills, diaphoresis, fatigue and fever.   HENT:  Positive for congestion. Negative for sinus pressure, sneezing and sore throat.    Respiratory:  Negative for cough and shortness of breath.    Cardiovascular:  Positive for palpitations. Negative for chest pain.   Gastrointestinal:  Negative for abdominal pain, diarrhea, nausea and vomiting.   Musculoskeletal:  Positive for arthralgias. Negative for joint swelling.   Skin:  Negative for rash and wound.   Neurological:  Positive for numbness (Chronic neuropathy feet). Negative for weakness.       Objective:      Vitals:    08/12/24 0854   BP: 122/78   BP Location: Left arm   Patient Position: Sitting   BP Method: Medium (Manual)   Pulse: 81   Resp: 18   SpO2: 96%   Weight: 115.3 kg (254 lb 3.1 oz)   Height: 5' 2" (1.575 m)     Physical Exam  Vitals reviewed.   Constitutional:       General: She is not in acute distress.     Appearance: Normal appearance. She is not ill-appearing.   HENT:      Head: Normocephalic and atraumatic.      Mouth/Throat:      " "Mouth: Mucous membranes are moist.      Pharynx: Oropharynx is clear. No oropharyngeal exudate or posterior oropharyngeal erythema.      Comments: Mallampati II  Eyes:      General:         Right eye: No discharge.         Left eye: No discharge.   Cardiovascular:      Rate and Rhythm: Normal rate and regular rhythm.      Pulses: Normal pulses.      Heart sounds: No murmur heard.  Pulmonary:      Effort: Pulmonary effort is normal.      Breath sounds: Normal breath sounds.   Musculoskeletal:         General: No deformity.   Skin:     General: Skin is warm and dry.      Coloration: Skin is not jaundiced.   Neurological:      General: No focal deficit present.      Mental Status: She is alert and oriented to person, place, and time.   Psychiatric:         Mood and Affect: Mood normal.         Behavior: Behavior normal.             Lab Results   Component Value Date     09/21/2023    K 4.1 09/21/2023     09/21/2023    CO2 23 09/21/2023    BUN 17 09/21/2023    CREATININE 0.7 09/21/2023    ANIONGAP 10 09/21/2023     Lab Results   Component Value Date    HGBA1C 5.2 09/21/2023     No results found for: "BNP", "BNPTRIAGEBLO"    Lab Results   Component Value Date    WBC 6.72 09/21/2023    HGB 14.1 09/21/2023    HCT 43.0 09/21/2023     09/21/2023    GRAN 3.3 09/21/2023    GRAN 48.7 09/21/2023     Lab Results   Component Value Date    CHOL 164 09/21/2023    HDL 53 09/21/2023    LDLCALC 94.4 09/21/2023    TRIG 83 09/21/2023          Current Outpatient Medications:     albuterol (PROVENTIL/VENTOLIN HFA) 90 mcg/actuation inhaler, Inhale 2 puffs into the lungs every 4 to 6 hours as needed for Wheezing., Disp: 18 g, Rfl: 5    atorvastatin (LIPITOR) 10 MG tablet, Take 10 mg by mouth., Disp: , Rfl:     ciprofloxacin HCl (CILOXAN) 0.3 % ophthalmic solution, Place 1 drop into both eyes every 2 (two) hours., Disp: , Rfl:     citalopram (CELEXA) 20 MG tablet, Take 20 mg by mouth., Disp: , Rfl:     diclofenac sodium " (VOLTAREN ARTHRITIS PAIN) 1 % Gel, Apply 2 g topically once daily., Disp: 100 g, Rfl: 5    fluticasone propionate (FLONASE) 50 mcg/actuation nasal spray, 2 sprays (100 mcg total) by Each Nostril route once daily., Disp: 15.8 mL, Rfl: 5    gabapentin (NEURONTIN) 100 MG capsule, Take by mouth. 200mg in the morning and 300mg at night, Disp: , Rfl:     levocetirizine (XYZAL) 5 MG tablet, Take 1 tablet (5 mg total) by mouth nightly as needed for Allergies., Disp: 30 tablet, Rfl: 11    levothyroxine (SYNTHROID) 125 MCG tablet, Take 125 mcg by mouth before breakfast., Disp: , Rfl:     omeprazole (PRILOSEC) 20 MG capsule, Take 1 capsule (20 mg total) by mouth once daily., Disp: 90 capsule, Rfl: 3    PROLENSA 0.07 % Drop, Apply 0.07 drops to eye Daily., Disp: , Rfl:     TRELEGY ELLIPTA 200-62.5-25 mcg inhaler, INHALE 1 PUFF INTO THE LUNGS ONCE DAILY., Disp: 180 each, Rfl: 3    [START ON 10/9/2024] dexmethylphenidate (FOCALIN) 5 MG tablet, Take 1 tablet (5 mg total) by mouth 2 (two) times daily., Disp: 60 tablet, Rfl: 0    dexmethylphenidate (FOCALIN) 5 MG tablet, Take 1 tablet (5 mg total) by mouth 2 (two) times daily., Disp: 60 tablet, Rfl: 0    [START ON 9/9/2024] dexmethylphenidate (FOCALIN) 5 MG tablet, Take 1 tablet (5 mg total) by mouth 2 (two) times daily., Disp: 60 tablet, Rfl: 0    HYDROcodone-acetaminophen (NORCO) 5-325 mg per tablet, Take 1 tablet by mouth every 8 (eight) hours as needed for Pain., Disp: 18 tablet, Rfl: 0        Assessment:       1. Pre-op exam    2. Cataract of right eye, unspecified cataract type    3. Need for hepatitis C screening test    4. Attention deficit disorder, unspecified hyperactivity presence    5. Chronic pain of both knees           Plan:       Pre-op exam  Cataract of right eye, unspecified cataract type  -     CBC Auto Differential; Future; Expected date: 08/12/2024  -     Comprehensive Metabolic Panel; Future; Expected date: 08/12/2024  - Agree with proceeding with procedure as  patient is at no increased risk based off of Revised Cardiac Risk Index  - Patient's risk score is 0.4%, informed patient this is her risk of MI, cardiac arrest, or death within 30 days    Need for hepatitis C screening test  -     Hepatitis C Antibody; Future; Expected date: 08/12/2024    Attention deficit disorder, unspecified hyperactivity presence  -     dexmethylphenidate (FOCALIN) 5 MG tablet; Take 1 tablet (5 mg total) by mouth 2 (two) times daily.  Dispense: 60 tablet; Refill: 0  -     dexmethylphenidate (FOCALIN) 5 MG tablet; Take 1 tablet (5 mg total) by mouth 2 (two) times daily.  Dispense: 60 tablet; Refill: 0  -     dexmethylphenidate (FOCALIN) 5 MG tablet; Take 1 tablet (5 mg total) by mouth 2 (two) times daily.  Dispense: 60 tablet; Refill: 0  -      reviewed and appropriate  -     Tolerating medication well at this time  - Informed patient of risk of cardiac strain on stimulant ant not typically advised over age 65. Patient aware of cardiac concerns    Chronic pain of both knees  -     HYDROcodone-acetaminophen (NORCO) 5-325 mg per tablet; Take 1 tablet by mouth every 8 (eight) hours as needed for Pain.  Dispense: 18 tablet; Refill: 0  - Can refill for intermittent use. If needing regularly, will need to re-establish with Pain Management provider    RTC PRN

## 2024-08-14 ENCOUNTER — PATIENT MESSAGE (OUTPATIENT)
Dept: PRIMARY CARE CLINIC | Facility: CLINIC | Age: 74
End: 2024-08-14
Payer: MEDICARE

## 2024-08-14 ENCOUNTER — TELEPHONE (OUTPATIENT)
Dept: PRIMARY CARE CLINIC | Facility: CLINIC | Age: 74
End: 2024-08-14
Payer: MEDICARE

## 2024-08-14 NOTE — TELEPHONE ENCOUNTER
----- Message from Howie Mendoza sent at 8/13/2024 12:17 PM CDT -----  Regarding: Fax Clearance  Contact: Jesus Guerrero [phone]  [email or web address]     Patient is calling for Medical Advice regarding: Patient is having surgery on August 15th and needs medical clearance faxed to this number:   Fax: [phone]    How long has patient had these symptoms:    Pharmacy name and phone#:    Patient wants a call back or thru [facility]: Call to confirm it was sent     Comments:

## 2024-08-21 DIAGNOSIS — E03.9 HYPOTHYROIDISM, UNSPECIFIED TYPE: Primary | ICD-10-CM

## 2024-08-21 NOTE — TELEPHONE ENCOUNTER
----- Message from Cheyenne Kearns sent at 8/21/2024 11:21 AM CDT -----  Contact: [phone]  Requesting an RX refill or new RX.    Is this a refill or new RX: refill    RX name and strength (copy/paste from chart):  levothyroxine (SYNTHROID) 125 MCG tablet    Is this a 30 day or 90 day RX: 90    Pharmacy name and phone # (copy/paste from chart):   [facility] [address]  [address] [place] [address]  Phone: [phone] Fax: [phone]        The doctors have asked that we provide their patients with the following 2 reminders -- prescription refills can take up to 72 hours, and a friendly reminder that in the future you can use your [facility] account to request refills: yes

## 2024-08-21 NOTE — TELEPHONE ENCOUNTER
No care due was identified.  [facility] Embedded Care Due Messages. Reference number: [id number].   8/21/2024 1:43:08 PM CDT

## 2024-08-22 RX ORDER — LEVOTHYROXINE SODIUM 125 UG/1
125 TABLET ORAL
Qty: 90 TABLET | Refills: 1 | Status: SHIPPED | OUTPATIENT
Start: 2024-08-22

## 2024-09-03 NOTE — TELEPHONE ENCOUNTER
Care Due:                  Date            Visit Type   Department     Provider  --------------------------------------------------------------------------------                                EP -                              PRIMARY      [facility]  Last Visit: 08-      CARE ([facility])   PRIMARY CARE   Hipolito Arboleda  Next Visit: None Scheduled  None         None Found                                                            Last  Test          Frequency    Reason                     Performed    Due Date  --------------------------------------------------------------------------------    TSH.........  12 months..  levothyroxine............  09-   09-    [facility] Embedded Care Due Messages. Reference number: [id number].   9/03/2024 3:13:06 PM CDT

## 2024-09-04 RX ORDER — ATORVASTATIN CALCIUM 10 MG/1
TABLET, FILM COATED ORAL
Qty: 90 TABLET | Refills: 0 | Status: SHIPPED | OUTPATIENT
Start: 2024-09-04

## 2024-09-04 NOTE — TELEPHONE ENCOUNTER
Refill Routing Note   Medication(s) are not appropriate for processing by [facility] for the following reason(s):        No active prescription written by provider    [facility] action(s):  Defer     Requires labs : Yes             Appointments  past 12m or future 3m with PCP    Date Provider   Last Visit   8/12/2024 Hipolito Arboleda MD   Next Visit   Visit date not found Hipolito Arboleda MD   ED visits in past 90 days: 0        Note composed:10:31 AM 09/04/2024

## 2024-09-05 RX ORDER — ATORVASTATIN CALCIUM 10 MG/1
10 TABLET, FILM COATED ORAL DAILY
Qty: 90 TABLET | Refills: 1 | OUTPATIENT
Start: 2024-09-05

## 2024-09-05 NOTE — TELEPHONE ENCOUNTER
Care Due:                  Date            Visit Type   Department     Provider  --------------------------------------------------------------------------------                                EP -                              PRIMARY      [facility]  Last Visit: 08-      CARE ([facility])   PRIMARY CARE   Hipolito Arboleda  Next Visit: None Scheduled  None         None Found                                                            Last  Test          Frequency    Reason                     Performed    Due Date  --------------------------------------------------------------------------------    Lipid Panel.  12 months..  atorvastatin.............  09-   09-    [facility] Embedded Care Due Messages. Reference number: [id number].   9/05/2024 8:42:04 AM CDT

## 2024-09-05 NOTE — TELEPHONE ENCOUNTER
----- Message from Shira Mccoy sent at 9/4/2024  3:19 PM CDT -----  Contact: [phone]  Requesting an RX refill or new RX.    Is this a refill or new RX: Refill     RX name and strength (copy/paste from chart):  atorvastatin (LIPITOR) 10 MG tablet    Is this a 30 day or 90 day RX:     Pharmacy name and phone # (copy/paste from chart):    [facility] [facility] [address]  [address] [place] [address]  Phone: [phone] Fax: [phone]        The doctors have asked that we provide their patients with the following 2 reminders -- prescription refills can take up to 72 hours, and a friendly reminder that in the future you can use your [facility] account to request refills: call back

## 2024-09-12 RX ORDER — CITALOPRAM 20 MG/1
20 TABLET, FILM COATED ORAL DAILY
Qty: 90 TABLET | Refills: 0 | Status: SHIPPED | OUTPATIENT
Start: 2024-09-12

## 2024-09-12 NOTE — TELEPHONE ENCOUNTER
No care due was identified.  [facility] Embedded Care Due Messages. Reference number: [id number].   9/12/2024 12:16:20 PM CDT

## 2024-09-12 NOTE — TELEPHONE ENCOUNTER
----- Message from Shira Mccoy sent at 9/10/2024  2:39 PM CDT -----  Contact: [phone]  Requesting an RX refill or new RX.    Is this a refill or new RX: Refill     RX name and strength (copy/paste from chart):  citalopram (CELEXA) 20 MG tablet    Is this a 30 day or 90 day RX:     Pharmacy name and phone # (copy/paste from chart):    [facility] [facility] [address]  [address] [place] [address]  Phone: [phone] Fax: [phone]        The doctors have asked that we provide their patients with the following 2 reminders -- prescription refills can take up to 72 hours, and a friendly reminder that in the future you can use your [facility] account to request refills: call back

## 2024-09-19 ENCOUNTER — PATIENT MESSAGE (OUTPATIENT)
Dept: PRIMARY CARE CLINIC | Facility: CLINIC | Age: 74
End: 2024-09-19
Payer: MEDICARE

## 2024-10-22 NOTE — PROGRESS NOTES
NUTRITION NOTE    Referring Physician: Quinn Corrales M.D.   Reason for MNT Referral: Medically supervised diet pending Gastric Sleeve    Patient presents for f/u visit for MSD with weight loss over the past month; total weight loss by making numerous dietary and lifestyle changes.     CLINICAL DATA:  73 y.o. female.    Current Weight: 245 lbs   Weight Change Since Initial Visit: Loss of Lost 17 lbs  Ideal Body Weight: 132 lbs  Body mass index is 44.92 kg/m².  Last wt: 247.2 lbs self reported on home scale with no clothes on    Initial Wt: 262 lbs    DAILY NUTRITIONAL NEEDS: pre-op nutritional bariatric guidelines to promote weight loss  8947-5268 Calories    Grams Protein    CURRENT DIET:  Reduced-calorie diet. 8533-5175 calories per day  Diet Recall: Food records are present.  Current Dietary  B: protein shake banana coffee skinny syrups  S:protein bar or small apple with 1-2 cheese string   L:turkey burger in air fryer with veggie ie broccoli  S: sometimes a banana or protein shake/bar  D: eating out ie 3 chicken finger and coleslaw or corndogs  S: Sometimes another shake    Diet Includes:   Meal Pattern: Improved.  Protein Supplements: 1-2 per day.atkins, fairlife and slimfast  Snacking: Adequate. Snacks include healthy choices.  Vegetables: Likes a variety. Eats almost daily.  Fruits: Likes a variety. Eats daily.watermelon   Beverages: water, on occ diet soda, diet decaf tea crystal light, coffee without sugar, and decreasing amt whole milk in coffee  Dining out: Weekly. 1-3 times per week Every Sunday Mostly fast food and restaurants. Better choices   Cooking at home: Weekly. Varies from daily until 1 day week Mostly baked and grilled air fryer, crock potmeat, fish, decreased starchy CHO, and vegetables.    Exercise:  Past exercise: Fair  Current exercise: None due to cold weather.  Restrictions to exercise: knee pain    Vitamins / Minerals / Herbs:   Melantonin    Labs:   Reviewed.    Food  Allergies:   MSG reaction    Social:  Retired.  Alcohol: None.   Smoking: None.    ASSESSMENT:  Patient demonstrates some willingness to change lifestyle habits as evidenced by weight loss, good exercise, dietary changes, including protein drinks, increased fruits, increased vegetables, reduced dining out, better choices when dining out, more food preparation at steven, healthier cooking at home, and healthier snacking at home.    Doing well with working on greatest challenges (starchy CHO).    Barriers to Education:  none  Stage of Change:  action    NUTRITION DIAGNOSIS:  Morbid Obesity related to Physical inactivity as evidence by BMI.  Status: Improved    PLAN:  Pt is good candidate for surgery.    Diet: Adjust diet plan.  Reduce frequency of dining out.  More grocery shopping and meal preparation at home.  Increase exercise.  Start shopping for bariatric vitamins & minerals.    Exercise: Increase.    Behavior Modification: Begin to document food & activity logs daily.        Communicated nutrition plan with bariatric team.    SESSION TIME:  30 minutes     yes

## 2024-10-23 ENCOUNTER — OFFICE VISIT (OUTPATIENT)
Dept: PRIMARY CARE CLINIC | Facility: CLINIC | Age: 74
End: 2024-10-23
Payer: MEDICARE

## 2024-10-23 VITALS
WEIGHT: 258.5 LBS | SYSTOLIC BLOOD PRESSURE: 120 MMHG | DIASTOLIC BLOOD PRESSURE: 74 MMHG | BODY MASS INDEX: 47.57 KG/M2 | OXYGEN SATURATION: 96 % | HEIGHT: 62 IN | RESPIRATION RATE: 18 BRPM | HEART RATE: 78 BPM

## 2024-10-23 DIAGNOSIS — E66.01 MORBID (SEVERE) OBESITY DUE TO EXCESS CALORIES: ICD-10-CM

## 2024-10-23 DIAGNOSIS — T45.1X5A CHEMOTHERAPY-INDUCED NEUROPATHY: ICD-10-CM

## 2024-10-23 DIAGNOSIS — I70.0 AORTIC ATHEROSCLEROSIS: ICD-10-CM

## 2024-10-23 DIAGNOSIS — Z85.3 HISTORY OF BREAST CANCER: ICD-10-CM

## 2024-10-23 DIAGNOSIS — G62.0 CHEMOTHERAPY-INDUCED NEUROPATHY: ICD-10-CM

## 2024-10-23 DIAGNOSIS — F98.8 ATTENTION DEFICIT DISORDER, UNSPECIFIED TYPE: Primary | ICD-10-CM

## 2024-10-23 PROCEDURE — 1159F MED LIST DOCD IN RCRD: CPT | Mod: CPTII,S$GLB,, | Performed by: STUDENT IN AN ORGANIZED HEALTH CARE EDUCATION/TRAINING PROGRAM

## 2024-10-23 PROCEDURE — 1126F AMNT PAIN NOTED NONE PRSNT: CPT | Mod: CPTII,S$GLB,, | Performed by: STUDENT IN AN ORGANIZED HEALTH CARE EDUCATION/TRAINING PROGRAM

## 2024-10-23 PROCEDURE — 3074F SYST BP LT 130 MM HG: CPT | Mod: CPTII,S$GLB,, | Performed by: STUDENT IN AN ORGANIZED HEALTH CARE EDUCATION/TRAINING PROGRAM

## 2024-10-23 PROCEDURE — 1160F RVW MEDS BY RX/DR IN RCRD: CPT | Mod: CPTII,S$GLB,, | Performed by: STUDENT IN AN ORGANIZED HEALTH CARE EDUCATION/TRAINING PROGRAM

## 2024-10-23 PROCEDURE — 99214 OFFICE O/P EST MOD 30 MIN: CPT | Mod: S$GLB,,, | Performed by: STUDENT IN AN ORGANIZED HEALTH CARE EDUCATION/TRAINING PROGRAM

## 2024-10-23 PROCEDURE — 3078F DIAST BP <80 MM HG: CPT | Mod: CPTII,S$GLB,, | Performed by: STUDENT IN AN ORGANIZED HEALTH CARE EDUCATION/TRAINING PROGRAM

## 2024-10-23 PROCEDURE — 99999 PR PBB SHADOW E&M-EST. PATIENT-LVL IV: CPT | Mod: PBBFAC,,, | Performed by: STUDENT IN AN ORGANIZED HEALTH CARE EDUCATION/TRAINING PROGRAM

## 2024-10-23 PROCEDURE — 3008F BODY MASS INDEX DOCD: CPT | Mod: CPTII,S$GLB,, | Performed by: STUDENT IN AN ORGANIZED HEALTH CARE EDUCATION/TRAINING PROGRAM

## 2024-10-23 RX ORDER — DEXMETHYLPHENIDATE HYDROCHLORIDE 5 MG/1
5 TABLET ORAL 2 TIMES DAILY
Qty: 60 TABLET | Refills: 0 | Status: SHIPPED | OUTPATIENT
Start: 2024-10-23

## 2024-10-23 RX ORDER — DEXMETHYLPHENIDATE HYDROCHLORIDE 5 MG/1
5 TABLET ORAL 2 TIMES DAILY
Qty: 60 TABLET | Refills: 0 | Status: SHIPPED | OUTPATIENT
Start: 2024-11-20

## 2024-10-23 RX ORDER — DEXMETHYLPHENIDATE HYDROCHLORIDE 5 MG/1
5 TABLET ORAL 2 TIMES DAILY
Qty: 60 TABLET | Refills: 0 | Status: SHIPPED | OUTPATIENT
Start: 2024-12-20

## 2024-10-23 NOTE — PROGRESS NOTES
Subjective:       Patient ID: Elizabeth Au is a 74 y.o. female.    Chief Complaint: Medication Refill      HPI:  74 y.o. female presents to [facility] here for follow-up visit and Focalin refill    Acute concerns?: Patient reports had a bad bout of indigestion 10/20/2024. Reports symptoms consistent with her pains from Acid reflux. Ate a half box of bran ad knows this was cause. Put heating pad on stomach and took humberto seltzer. Belch relieved. Declines Cardiac work-up today.    Negative Echo stress 9/20/2024    Currently on Focalin 5 mg for ADD. Last refilled 8/12/2024  Medication working well?: Yes  Chest pain/Palpitations?: Patient reports palpitations, no change from prior episodes  Appetite change?: No  Anxiety?: Feels depressed if misses dose  Insomnia?: No    Feels mood is currently down.    Recent stressors?: Feels that she had a hard time to avoid empathy when seeing bad happen to others  Exercise?: No specific  Meditation?: Used to  Good local support?: Yes  Therapist?: Declines at this time, will reach out if needed  Treated in past?: Yes  Manic symptoms when explained?: No    History of cancer to left breast with mastectomy and chemo and radiation therapy following.     Review of Systems   Constitutional:  Negative for activity change and unexpected weight change.   HENT:  Negative for hearing loss, rhinorrhea and trouble swallowing.    Eyes:  Negative for discharge and visual disturbance.   Respiratory:  Negative for chest tightness and wheezing.    Cardiovascular:  Positive for palpitations. Negative for chest pain.   Gastrointestinal:  Negative for blood in stool, constipation, diarrhea and vomiting.   Endocrine: Negative for polydipsia and polyuria.   Genitourinary:  Negative for difficulty urinating, dysuria, hematuria and menstrual problem.   Musculoskeletal:  Negative for arthralgias, joint swelling and neck pain.   Neurological:  Negative for weakness and headaches.   Psychiatric/Behavioral:   "Positive for dysphoric mood. Negative for confusion.        Objective:      Vitals:    10/23/24 1408   BP: 120/74   BP Location: Left arm   Patient Position: Sitting   Pulse: 78   Resp: 18   SpO2: 96%   Weight: 117.3 kg (258 lb 7.8 oz)   Height: 5' 2" (1.575 m)     Physical Exam  Vitals reviewed.   Constitutional:       General: She is not in acute distress.     Appearance: Normal appearance. She is not ill-appearing.   HENT:      Head: Normocephalic and atraumatic.   Eyes:      General:         Right eye: No discharge.         Left eye: No discharge.      Conjunctiva/sclera: Conjunctivae normal.   Cardiovascular:      Rate and Rhythm: Normal rate and regular rhythm.      Pulses: Normal pulses.      Heart sounds: No murmur heard.  Pulmonary:      Effort: Pulmonary effort is normal.      Breath sounds: Normal breath sounds.   Musculoskeletal:         General: No deformity.      Cervical back: Neck supple. No rigidity.   Lymphadenopathy:      Cervical: No cervical adenopathy.   Skin:     General: Skin is warm and dry.      Coloration: Skin is not jaundiced.   Neurological:      General: No focal deficit present.      Mental Status: She is alert and oriented to person, place, and time.   Psychiatric:         Mood and Affect: Mood normal.         Behavior: Behavior normal.             Lab Results   Component Value Date     08/12/2024    K 4.4 08/12/2024     08/12/2024    CO2 25 08/12/2024    BUN 24 (H) 08/12/2024    CREATININE 0.8 08/12/2024    ANIONGAP 9 08/12/2024     Lab Results   Component Value Date    HGBA1C 5.2 09/21/2023     No results found for: "BNP", "BNPTRIAGEBLO"    Lab Results   Component Value Date    WBC 9.47 08/12/2024    HGB 14.7 08/12/2024    HCT 45.7 08/12/2024     08/12/2024    GRAN 5.3 08/12/2024    GRAN 56.1 08/12/2024     Lab Results   Component Value Date    CHOL 164 09/21/2023    HDL 53 09/21/2023    LDLCALC 94.4 09/21/2023    TRIG 83 09/21/2023          Current Outpatient " Medications:     albuterol (PROVENTIL/VENTOLIN HFA) 90 mcg/actuation inhaler, Inhale 2 puffs into the lungs every 4 to 6 hours as needed for Wheezing., Disp: 18 g, Rfl: 5    atorvastatin (LIPITOR) 10 MG tablet, TAKE ONE TABLET BY MOUTH EACH EVENING FOR CHOLESTEROL, Disp: 90 tablet, Rfl: 0    benralizumab 30 mg/mL AtIn, Inject 30 mg into the skin., Disp: , Rfl:     citalopram (CELEXA) 20 MG tablet, Take 1 tablet (20 mg total) by mouth once daily., Disp: 90 tablet, Rfl: 0    diclofenac sodium (VOLTAREN ARTHRITIS PAIN) 1 % Gel, Apply 2 g topically once daily., Disp: 100 g, Rfl: 5    fluticasone propionate (FLONASE) 50 mcg/actuation nasal spray, 2 sprays (100 mcg total) by Each Nostril route once daily., Disp: 15.8 mL, Rfl: 5    gabapentin (NEURONTIN) 100 MG capsule, Take by mouth. 200mg in the morning and 300mg at night, Disp: , Rfl:     HYDROcodone-acetaminophen (NORCO) 5-325 mg per tablet, Take 1 tablet by mouth every 8 (eight) hours as needed for Pain., Disp: 18 tablet, Rfl: 0    levothyroxine (SYNTHROID) 125 MCG tablet, Take 1 tablet (125 mcg total) by mouth before breakfast., Disp: 90 tablet, Rfl: 1    omeprazole (PRILOSEC) 20 MG capsule, Take 1 capsule (20 mg total) by mouth once daily., Disp: 90 capsule, Rfl: 3    TRELEGY ELLIPTA 200-62.5-25 mcg inhaler, INHALE 1 PUFF INTO THE LUNGS ONCE DAILY., Disp: 180 each, Rfl: 3    dexmethylphenidate (FOCALIN) 5 MG tablet, Take 1 tablet (5 mg total) by mouth 2 (two) times daily., Disp: 60 tablet, Rfl: 0    [START ON 11/20/2024] dexmethylphenidate (FOCALIN) 5 MG tablet, Take 1 tablet (5 mg total) by mouth 2 (two) times daily., Disp: 60 tablet, Rfl: 0    [START ON 12/20/2024] dexmethylphenidate (FOCALIN) 5 MG tablet, Take 1 tablet (5 mg total) by mouth 2 (two) times daily., Disp: 60 tablet, Rfl: 0        Assessment:       1. Morbid (severe) obesity due to excess calories    2. Attention deficit disorder, unspecified type    3. History of breast cancer    4. Aortic  atherosclerosis    5. Chemotherapy-induced neuropathy           Plan:       Attention deficit disorder, unspecified type  -     dexmethylphenidate (FOCALIN) 5 MG tablet; Take 1 tablet (5 mg total) by mouth 2 (two) times daily.  Dispense: 60 tablet; Refill: 0  -     dexmethylphenidate (FOCALIN) 5 MG tablet; Take 1 tablet (5 mg total) by mouth 2 (two) times daily.  Dispense: 60 tablet; Refill: 0  -     dexmethylphenidate (FOCALIN) 5 MG tablet; Take 1 tablet (5 mg total) by mouth 2 (two) times daily.  Dispense: 60 tablet; Refill: 0  -      reviewed and appropriate  - If indigestion becomes recurrent, instructed patient to notify clinic  - If signs of cardiac cause such as chest pain, jaw/arm numbness/tingling, palpitations, nausea, or sweats present to ED. Informed patient of Focalin as a cardiac risk factor and offered decrease in medication. Patient requesting to just continue to monitor symptoms at this time.    Morbid (severe) obesity due to excess calories  - Exercise guidance provided today's visit    History of breast cancer  - Will continue with routine screening exams    Aortic atherosclerosis  - Continue statin    Chemotherapy-induced neuropathy  - No intervention desired at this time, continue gabapentin

## 2024-12-02 DIAGNOSIS — E03.9 HYPOTHYROIDISM, UNSPECIFIED TYPE: ICD-10-CM

## 2024-12-02 NOTE — TELEPHONE ENCOUNTER
Care Due:                  Date            Visit Type   Department     Provider  --------------------------------------------------------------------------------                                MYCHART                              FOLLOWUP/OF  [facility]  Last Visit: 10-      FICE VISIT   PRIMARY CARE   Hipolito Arboleda                               -                              PRIMARY      [facility]  Next Visit: 01-      CARE ([facility])   PRIMARY CARE   Hipolito Arboleda                                                            Last  Test          Frequency    Reason                     Performed    Due Date  --------------------------------------------------------------------------------    Lipid Panel.  12 months..  atorvastatin.............  09-   09-    TSH.........  12 months..  levothyroxine............  09-   09-    [facility] Embedded Care Due Messages. Reference number: [id number].   12/02/2024 12:51:50 PM CST

## 2024-12-03 RX ORDER — LEVOTHYROXINE SODIUM 125 UG/1
125 TABLET ORAL
Qty: 90 TABLET | Refills: 0 | Status: SHIPPED | OUTPATIENT
Start: 2024-12-03

## 2024-12-03 NOTE — TELEPHONE ENCOUNTER
Refill Routing Note   Medication(s) are not appropriate for processing by [facility] for the following reason(s):        Required labs outdated  No active prescription written by provider    [facility] action(s):  Defer     Requires labs : Yes             Appointments  past 12m or future 3m with PCP    Date Provider   Last Visit   10/23/2024 Hipolito Arboleda MD   Next Visit   1/23/2025 Hipolito Arboleda MD   ED visits in past 90 days: 0        Note composed:9:08 AM 12/03/2024

## 2024-12-10 ENCOUNTER — PATIENT MESSAGE (OUTPATIENT)
Dept: ADMINISTRATIVE | Facility: CLINIC | Age: 74
End: 2024-12-10
Payer: MEDICARE

## 2024-12-12 ENCOUNTER — TELEPHONE (OUTPATIENT)
Dept: ADMINISTRATIVE | Facility: CLINIC | Age: 74
End: 2024-12-12
Payer: MEDICARE

## 2024-12-12 NOTE — TELEPHONE ENCOUNTER
Called pt; no answer; left message informing patient I was calling to confirm pt's virtual EAWV today at 12:00pm and to see if pt needed any help with setting up for virtual visit or e-pre check and to login 10 minutes prior to scheduled appt; left my name & number for pt to return my call if pt had any questions or concerns; sent message through portal

## 2024-12-23 NOTE — TELEPHONE ENCOUNTER
No care due was identified.  [facility] Embedded Care Due Messages. Reference number: [id number].   12/23/2024 1:29:23 PM CST

## 2024-12-24 RX ORDER — CITALOPRAM 20 MG/1
20 TABLET, FILM COATED ORAL DAILY
Qty: 90 TABLET | Refills: 0 | Status: SHIPPED | OUTPATIENT
Start: 2024-12-24

## 2024-12-24 NOTE — TELEPHONE ENCOUNTER
Refill Routing Note   Medication(s) are not appropriate for processing by [facility] for the following reason(s):        No active prescription written by provider    [facility] action(s):  Defer      Medication Therapy Plan: The provider responsible for managing the medication isnt the PCP      Appointments  past 12m or future 3m with PCP    Date Provider   Last Visit   10/23/2024 Hipolito Arboleda MD   Next Visit   1/23/2025 Hipolito Arboleda MD   ED visits in past 90 days: 0        Note composed:9:44 PM 12/23/2024

## 2025-01-05 PROBLEM — N39.0 UTI (URINARY TRACT INFECTION): Status: ACTIVE | Noted: 2025-01-05

## 2025-01-05 PROBLEM — R55 SYNCOPE: Status: ACTIVE | Noted: 2025-01-05

## 2025-01-06 PROBLEM — R00.2 PALPITATIONS: Status: ACTIVE | Noted: 2025-01-06

## 2025-01-06 PROBLEM — E78.2 MIXED HYPERLIPIDEMIA: Status: ACTIVE | Noted: 2023-09-20

## 2025-01-06 PROBLEM — E66.9 OBESITY: Status: ACTIVE | Noted: 2024-10-23

## 2025-01-07 PROBLEM — R00.2 PALPITATIONS: Status: RESOLVED | Noted: 2025-01-06 | Resolved: 2025-01-07

## 2025-01-08 ENCOUNTER — TELEPHONE (OUTPATIENT)
Dept: PRIMARY CARE CLINIC | Facility: CLINIC | Age: 75
End: 2025-01-08
Payer: MEDICARE

## 2025-01-08 NOTE — TELEPHONE ENCOUNTER
----- Message from Denisha Baltazar sent at 1/7/2025  9:51 AM CST -----  Regarding: Hospital Follow Up  Good Morning.     Patient expected to discharge from hospital today. Patient has a 3 month follow up on 1/23/25. Patient in need of a 7-10 day hospital follow up if possible. Please contact patient directly to schedule. Did not want to add on an appointment if your office wanted to just try and move 1/23/25 appointment sooner.     Thank you so much

## 2025-01-09 ENCOUNTER — PATIENT OUTREACH (OUTPATIENT)
Dept: ADMINISTRATIVE | Facility: CLINIC | Age: 75
End: 2025-01-09
Payer: MEDICARE

## 2025-01-09 NOTE — PROGRESS NOTES
C3 nurse spoke with Elizabeth Au  for a TCC post hospital discharge follow up call. The patient has a scheduled appointment with Hipolito Arboleda MD  on 01/23/25 @ 1300.

## 2025-01-16 RX ORDER — ATORVASTATIN CALCIUM 10 MG/1
10 TABLET, FILM COATED ORAL DAILY
Qty: 90 TABLET | Refills: 1 | Status: SHIPPED | OUTPATIENT
Start: 2025-01-16

## 2025-01-16 NOTE — TELEPHONE ENCOUNTER
No care due was identified.  [facility] Embedded Care Due Messages. Reference number: [id number].   1/16/2025 11:57:13 AM CST

## 2025-01-28 ENCOUNTER — OFFICE VISIT (OUTPATIENT)
Dept: PRIMARY CARE CLINIC | Facility: CLINIC | Age: 75
End: 2025-01-28
Payer: MEDICARE

## 2025-01-28 VITALS
HEART RATE: 75 BPM | WEIGHT: 255.5 LBS | BODY MASS INDEX: 45.26 KG/M2 | OXYGEN SATURATION: 95 % | DIASTOLIC BLOOD PRESSURE: 68 MMHG | SYSTOLIC BLOOD PRESSURE: 130 MMHG

## 2025-01-28 DIAGNOSIS — Z13.820 ENCOUNTER FOR SCREENING FOR OSTEOPOROSIS: ICD-10-CM

## 2025-01-28 DIAGNOSIS — Z09 HOSPITAL DISCHARGE FOLLOW-UP: Primary | ICD-10-CM

## 2025-01-28 DIAGNOSIS — R55 SYNCOPE, UNSPECIFIED SYNCOPE TYPE: ICD-10-CM

## 2025-01-28 DIAGNOSIS — R30.0 DYSURIA: ICD-10-CM

## 2025-01-28 PROCEDURE — 1160F RVW MEDS BY RX/DR IN RCRD: CPT | Mod: CPTII,S$GLB,,

## 2025-01-28 PROCEDURE — 3008F BODY MASS INDEX DOCD: CPT | Mod: CPTII,S$GLB,,

## 2025-01-28 PROCEDURE — 3075F SYST BP GE 130 - 139MM HG: CPT | Mod: CPTII,S$GLB,,

## 2025-01-28 PROCEDURE — 99214 OFFICE O/P EST MOD 30 MIN: CPT | Mod: S$GLB,,,

## 2025-01-28 PROCEDURE — 3078F DIAST BP <80 MM HG: CPT | Mod: CPTII,S$GLB,,

## 2025-01-28 PROCEDURE — 1159F MED LIST DOCD IN RCRD: CPT | Mod: CPTII,S$GLB,,

## 2025-01-28 PROCEDURE — 99999 PR PBB SHADOW E&M-EST. PATIENT-LVL IV: CPT | Mod: PBBFAC,,,

## 2025-01-28 PROCEDURE — 3044F HG A1C LEVEL LT 7.0%: CPT | Mod: CPTII,S$GLB,,

## 2025-01-28 RX ORDER — PHENAZOPYRIDINE HYDROCHLORIDE 100 MG/1
100 TABLET, FILM COATED ORAL 3 TIMES DAILY PRN
Qty: 30 TABLET | Refills: 0 | Status: SHIPPED | OUTPATIENT
Start: 2025-01-28 | End: 2025-02-07

## 2025-01-28 NOTE — PROGRESS NOTES
Clinic Note  1/28/2025      Subjective:       Patient ID:  ERIK is a 74 y.o. female being seen for an established visit.    Chief Complaint: No chief complaint on file.    Pt presents to clinic for hospital discharge follow up     Patient was admitted on 1/5/25 for Syncope and UTI.     Patient denies any other syncope episodes since hospital discharge and reports she has a cardiology consult 2/24/25. Patient denies any active chest pain, SOB, or palpitations in clinic today.    UTI in hospital. Given course of Macrobid. Pt reports she has completed course but reports UTI symptoms- pt reports still having dysuria that started 2 days ago however denies any other urinary symptoms including frequency, hematuria, or urgency.      Mammogram due, pt hx of breast cancer, states she will follow up with her oncologists for mammogram     DEXA scan due     Patient denies any other concerns/refills today      Review of Systems   Constitutional:  Negative for appetite change, fatigue, fever and unexpected weight change.   HENT:  Negative for hearing loss and sore throat.    Eyes:  Negative for pain and visual disturbance.   Respiratory:  Negative for cough, shortness of breath and wheezing.    Cardiovascular:  Negative for chest pain, palpitations and leg swelling.   Gastrointestinal:  Negative for abdominal pain, blood in stool, constipation, diarrhea, nausea and vomiting.   Genitourinary:  Positive for dysuria. Negative for flank pain, frequency, hematuria and urgency.   Musculoskeletal:  Negative for arthralgias.   Neurological:  Negative for dizziness and headaches.   Psychiatric/Behavioral:  Negative for suicidal ideas.         Medication List with Changes/Refills   New Medications    PHENAZOPYRIDINE (PYRIDIUM) 100 MG TABLET    Take 1 tablet (100 mg total) by mouth 3 (three) times daily as needed for Pain.   Current Medications    ALBUTEROL (PROVENTIL/VENTOLIN HFA) 90 MCG/ACTUATION INHALER    Inhale 2 puffs into the lungs  "every 4 to 6 hours as needed for Wheezing.    ATORVASTATIN (LIPITOR) 10 MG TABLET    Take 1 tablet (10 mg total) by mouth once daily.    BENRALIZUMAB 30 MG/ML ATIN    Inject 30 mg into the skin. Every other month    CITALOPRAM (CELEXA) 20 MG TABLET    Take 1 tablet (20 mg total) by mouth once daily.    DEXMETHYLPHENIDATE (FOCALIN) 5 MG TABLET    Take 1 tablet (5 mg total) by mouth 2 (two) times daily.    DICLOFENAC SODIUM (VOLTAREN ARTHRITIS PAIN) 1 % GEL    Apply 2 g topically once daily.    FLUTICASONE PROPIONATE (FLONASE) 50 MCG/ACTUATION NASAL SPRAY    2 sprays (100 mcg total) by Each Nostril route once daily.    GABAPENTIN (NEURONTIN) 100 MG CAPSULE    Take by mouth. 200mg in the morning and 300mg at night    HYDROCODONE-ACETAMINOPHEN (NORCO) 5-325 MG PER TABLET    Take 1 tablet by mouth every 8 (eight) hours as needed for Pain.    LEVOTHYROXINE (SYNTHROID) 125 MCG TABLET    TAKE 1 TABLET (125 MCG TOTAL) BY MOUTH BEFORE BREAKFAST.    OMEPRAZOLE (PRILOSEC) 20 MG CAPSULE    Take 1 capsule (20 mg total) by mouth once daily.    ONDANSETRON (ZOFRAN-ODT) 4 MG TBDL    Take 1 tablet (4 mg total) by mouth every 12 (twelve) hours as needed (Nausea or vomiting).    TRELEGY ELLIPTA 200-62.5-25 MCG INHALER    INHALE 1 PUFF INTO THE LUNGS ONCE DAILY.       Patient Active Problem List   Diagnosis    Mixed hyperlipidemia    Asthma    Chemotherapy-induced neuropathy    Lymphedema    Hypothyroidism    Gastroesophageal reflux disease without esophagitis    OA (osteoarthritis) of knee    Obesity    History of breast cancer    Aortic atherosclerosis    Syncope    UTI (urinary tract infection)           Objective:      /68 (Patient Position: Sitting)   Pulse 75   Wt 115.9 kg (255 lb 8.2 oz)   SpO2 95%   BMI 45.26 kg/m²   Estimated body mass index is 45.26 kg/m² as calculated from the following:    Height as of 1/6/25: 5' 3" (1.6 m).    Weight as of this encounter: 115.9 kg (255 lb 8.2 oz).  Physical Exam  Vitals and " nursing note reviewed.   Constitutional:       General: She is not in acute distress.  HENT:      Head: Atraumatic.   Cardiovascular:      Rate and Rhythm: Normal rate. Rhythm irregular.      Heart sounds: No murmur heard.     Comments: Ausculted skipped heartbeats   Pulmonary:      Effort: Pulmonary effort is normal. No respiratory distress.      Breath sounds: Normal breath sounds. No wheezing, rhonchi or rales.   Abdominal:      Tenderness: There is no right CVA tenderness or left CVA tenderness.   Musculoskeletal:         General: Normal range of motion.      Right lower leg: No edema.      Left lower leg: No edema.   Neurological:      Mental Status: She is alert and oriented to person, place, and time.      Gait: Gait normal.   Psychiatric:         Mood and Affect: Mood normal.         Thought Content: Thought content normal.             Assessment and Plan:         Problem List Items Addressed This Visit          Other    Syncope     Other Visit Diagnoses       Hospital discharge follow-up    -  Primary    Dysuria        Relevant Medications    phenazopyridine (PYRIDIUM) 100 MG tablet    Other Relevant Orders    Urinalysis, Reflex to Urine Culture Urine, Clean Catch (Completed)    Encounter for screening for osteoporosis        Relevant Orders    DXA Bone Density Appendicular Skeleton          Will send antibiotics for UTI symptoms pending urine culture. Pyridium ordered for symptom relief while pending    Reviewed risks, benefits, and appropriate medication use prescribed  Discussed LS changes as appropriate   Reviewed cancer screening guidelines   Advised to keep speciality and follow up appointments as scheduled   Reviewed ER precautions   Verbalized understanding and is agreeable to plan      Follow Up:   Follow up in about 3 months (around 4/28/2025), or if symptoms worsen or fail to improve.    Other Orders Placed This Visit:  Orders Placed This Encounter   Procedures    DXA Bone Density Appendicular  Skeleton    Urinalysis, Reflex to Urine Culture Urine, Clean Catch           Conner Ingram, P-BC

## 2025-01-31 DIAGNOSIS — F98.8 ATTENTION DEFICIT DISORDER, UNSPECIFIED TYPE: ICD-10-CM

## 2025-01-31 NOTE — TELEPHONE ENCOUNTER
Care Due:                  Date            Visit Type   Department     Provider  --------------------------------------------------------------------------------                                MYCHART                              FOLLOWUP/OF  [facility]IN[facility]  Last Visit: 10-      FICE VISIT   PRIMARY CARE   Hipolito Arboleda                               -                              PRIMARY      [facility]  Next Visit: 05-      CARE ([facility])   PRIMARY CARE   Hipolito Arboleda                                                            Last  Test          Frequency    Reason                     Performed    Due Date  --------------------------------------------------------------------------------    Lipid Panel.  12 months..  atorvastatin.............  09-   09-    [facility] Embedded Care Due Messages. Reference number: [id number].   1/31/2025 5:51:20 PM CST

## 2025-02-03 RX ORDER — DEXMETHYLPHENIDATE HYDROCHLORIDE 5 MG/1
5 TABLET ORAL 2 TIMES DAILY
Qty: 60 TABLET | Refills: 0 | Status: SHIPPED | OUTPATIENT
Start: 2025-02-03

## 2025-02-24 ENCOUNTER — OFFICE VISIT (OUTPATIENT)
Dept: CARDIOLOGY | Facility: CLINIC | Age: 75
End: 2025-02-24
Payer: MEDICARE

## 2025-02-24 VITALS
WEIGHT: 257.13 LBS | OXYGEN SATURATION: 97 % | HEART RATE: 63 BPM | BODY MASS INDEX: 45.54 KG/M2 | DIASTOLIC BLOOD PRESSURE: 60 MMHG | SYSTOLIC BLOOD PRESSURE: 112 MMHG

## 2025-02-24 DIAGNOSIS — R55 SYNCOPE, UNSPECIFIED SYNCOPE TYPE: Primary | ICD-10-CM

## 2025-02-24 DIAGNOSIS — E66.01 SEVERE OBESITY: ICD-10-CM

## 2025-02-24 DIAGNOSIS — R00.2 PALPITATIONS: ICD-10-CM

## 2025-02-24 PROCEDURE — 1101F PT FALLS ASSESS-DOCD LE1/YR: CPT | Mod: CPTII,S$GLB,, | Performed by: INTERNAL MEDICINE

## 2025-02-24 PROCEDURE — 1159F MED LIST DOCD IN RCRD: CPT | Mod: CPTII,S$GLB,, | Performed by: INTERNAL MEDICINE

## 2025-02-24 PROCEDURE — 3044F HG A1C LEVEL LT 7.0%: CPT | Mod: CPTII,S$GLB,, | Performed by: INTERNAL MEDICINE

## 2025-02-24 PROCEDURE — 3008F BODY MASS INDEX DOCD: CPT | Mod: CPTII,S$GLB,, | Performed by: INTERNAL MEDICINE

## 2025-02-24 PROCEDURE — 1126F AMNT PAIN NOTED NONE PRSNT: CPT | Mod: CPTII,S$GLB,, | Performed by: INTERNAL MEDICINE

## 2025-02-24 PROCEDURE — 99215 OFFICE O/P EST HI 40 MIN: CPT | Mod: S$GLB,,, | Performed by: INTERNAL MEDICINE

## 2025-02-24 PROCEDURE — 3078F DIAST BP <80 MM HG: CPT | Mod: CPTII,S$GLB,, | Performed by: INTERNAL MEDICINE

## 2025-02-24 PROCEDURE — 3288F FALL RISK ASSESSMENT DOCD: CPT | Mod: CPTII,S$GLB,, | Performed by: INTERNAL MEDICINE

## 2025-02-24 PROCEDURE — 3074F SYST BP LT 130 MM HG: CPT | Mod: CPTII,S$GLB,, | Performed by: INTERNAL MEDICINE

## 2025-02-24 PROCEDURE — 99999 PR PBB SHADOW E&M-EST. PATIENT-LVL IV: CPT | Mod: PBBFAC,,, | Performed by: INTERNAL MEDICINE

## 2025-02-24 PROCEDURE — 1160F RVW MEDS BY RX/DR IN RCRD: CPT | Mod: CPTII,S$GLB,, | Performed by: INTERNAL MEDICINE

## 2025-02-24 NOTE — PROGRESS NOTES
[facility]    Chief Complaint  Chief Complaint   Patient presents with    Loss of Consciousness       HPI:    Patient presents for follow-up after hospitalization for a syncopal episode.  She was in her usual state of health when she went to [place].  She was fatigued because she normally does not sleep very much at night.  She began feeling palpitations.  She has frequent palpitations but these were stronger and faster than usual.  Also more prolonged.  Lasted at least 20 minutes.  Without any further prodrome, she lost consciousness.  When she woke up she was on the ground and EMS had already arrived.  Thereafter had quick return to normal sensorium.  Was hospitalized overnight.  Cardiac workup was unrevealing.  Since then has cut back on her caffeine intake.  Less palpitations.  No further syncopal episodes.  Activities are very limited because of her obesity and arthritis.  With what she does, no exertional dyspnea or chest discomfort.  She wants to get back to exercise.    Medications  Current Medications[1]     History  Past Medical History:   Diagnosis Date    Asthma     Cancer     Hyperlipidemia     Syncope and collapse     Thyroid disease      Past Surgical History:   Procedure Laterality Date    APPENDECTOMY      BREAST SURGERY      Cancer left breast lumpectomy     SECTION      EYE SURGERY      Cataract removal    JOINT REPLACEMENT      TONSILLECTOMY      TUBAL LIGATION       Social History[2]  Family History   Problem Relation Name Age of Onset    Stroke Mother Lainacarlie Walters     Arthritis Father Todd Walters     Heart disease Father Todd Walters     Cancer Maternal Grandmother Arlette Zazuetajacqueline     Arthritis Sister Merari Montalvo     Miscarriages / Stillbirths Sister Merari Montalvo     Cancer Maternal Aunt Terese Wrotetre         Allergies  Review of patient's allergies indicates:   Allergen Reactions    Penicillins Hives       Review of Systems   Review of  Systems   Constitutional: Negative for malaise/fatigue, weight gain and weight loss.   Eyes:  Negative for visual disturbance.   Cardiovascular:  Positive for irregular heartbeat, palpitations and syncope. Negative for chest pain, claudication, cyanosis, dyspnea on exertion, leg swelling, near-syncope, orthopnea and paroxysmal nocturnal dyspnea.   Respiratory:  Negative for cough, hemoptysis, shortness of breath, sleep disturbances due to breathing and wheezing.    Hematologic/Lymphatic: Negative for bleeding problem. Does not bruise/bleed easily.   Skin:  Negative for poor wound healing.   Musculoskeletal:  Negative for muscle cramps and myalgias.   Gastrointestinal:  Negative for abdominal pain, anorexia, diarrhea, heartburn, hematemesis, hematochezia, melena, nausea and vomiting.   Genitourinary:  Negative for hematuria and nocturia.   Neurological:  Negative for excessive daytime sleepiness, dizziness, focal weakness, light-headedness and weakness.       Physical Exam  Vitals:    02/24/25 1506   BP: 112/60   Pulse: 63     Wt Readings from Last 1 Encounters:   02/24/25 116.6 kg (257 lb 1.6 oz)     Physical Exam  Vitals and nursing note reviewed.   Constitutional:       General: She is not in acute distress.     Appearance: She is obese. She is not toxic-appearing or diaphoretic.   HENT:      Head: Normocephalic and atraumatic.      Mouth/Throat:      Lips: Pink.      Mouth: Mucous membranes are moist.   Eyes:      General: No scleral icterus.     Conjunctiva/sclera: Conjunctivae normal.   Neck:      Thyroid: No thyromegaly.      Vascular: No carotid bruit, hepatojugular reflux or JVD.      Trachea: Trachea normal.   Cardiovascular:      Rate and Rhythm: Normal rate and regular rhythm. No extrasystoles are present.     Pulses:           Carotid pulses are 2+ on the right side and 2+ on the left side.       Radial pulses are 2+ on the right side and 2+ on the left side.      Heart sounds: S1 normal and S2 normal.  No murmur heard.     No friction rub. No S3 or S4 sounds.   Pulmonary:      Effort: Pulmonary effort is normal. No accessory muscle usage or respiratory distress.      Breath sounds: Normal breath sounds and air entry. No decreased breath sounds, wheezing, rhonchi or rales.   Abdominal:      General: Bowel sounds are normal. There is no distension or abdominal bruit.      Palpations: Abdomen is soft. There is no hepatomegaly, splenomegaly or pulsatile mass.      Tenderness: There is no abdominal tenderness.   Musculoskeletal:         General: No tenderness or deformity.      Right lower leg: No edema.      Left lower leg: No edema.   Skin:     General: Skin is warm and dry.      Capillary Refill: Capillary refill takes less than 2 seconds.      Coloration: Skin is not cyanotic or pale.      Nails: There is no clubbing.   Neurological:      General: No focal deficit present.      Mental Status: She is alert and oriented to person, place, and time.   Psychiatric:         Attention and Perception: Attention normal.         Mood and Affect: Mood normal.         Speech: Speech normal.         Behavior: Behavior normal. Behavior is cooperative.         Labs  Lab Visit on 01/28/2025   Component Date Value Ref Range Status    Specimen UA 01/28/2025 Urine, Clean Catch   Final    Color, UA 01/28/2025 Yellow  Yellow, Straw, Chantell Final    Appearance, UA 01/28/2025 Clear  Clear Final    pH, UA 01/28/2025 6.0  5.0 - 8.0 Final    Specific Gravity, UA 01/28/2025 >1.030 (A)  1.005 - 1.030 Final    Protein, UA 01/28/2025 1+ (A)  Negative Final    Comment: Recommend a 24 hour urine protein or a urine   protein/creatinine ratio if globulin induced proteinuria is  clinically suspected.      Glucose, UA 01/28/2025 Negative  Negative Final    Ketones, UA 01/28/2025 Negative  Negative Final    Bilirubin (UA) 01/28/2025 Negative  Negative Final    Occult Blood UA 01/28/2025 Negative  Negative Final    Nitrite, UA 01/28/2025 Negative   Negative Final    Urobilinogen, UA 01/28/2025 2.0-3.0 (A)  Negative EU/dL Final    Leukocytes, UA 01/28/2025 3+ (A)  Negative Final    RBC, UA 01/28/2025 1  0 - 4 /hpf Final    WBC, UA 01/28/2025 21 (H)  0 - 5 /hpf Final    Bacteria 01/28/2025 None  None-Occ /hpf Final    Squam Epithel, UA 01/28/2025 2  /hpf Final    Hyaline Casts, UA 01/28/2025 3 (A)  0-1/lpf /lpf Final    Microscopic Comment 01/28/2025 SEE COMMENT   Final    Comment: Other formed elements not mentioned in the report are not   present in the microscopic examination.       Urine Culture, Routine 01/28/2025 Multiple organisms isolated. None in predominance.  Repeat if   Final    Urine Culture, Routine 01/28/2025 clinically necessary.   Final   No results displayed because visit has over 200 results.          Imaging  DXA Bone Density Appendicular Skeleton  Result Date: 2/4/2025  EXAMINATION: DEXA BONE DENSITY APPENDICULAR SKELETON CLINICAL HISTORY: Encounter for screening for osteoporosis TECHNIQUE: DXA scanning was performed over the left hip and lumbar spine.  Review of the images confirms satisfactory positioning and technique. DXA scanning was performed over the wrist and lumbar spine.  Review of the images confirms satisfactory positioning and technique. COMPARISON: None FINDINGS: The L1 to L4 vertebral bone mineral density is equal to 0.899 g/cm squared with a T score of -1.3. The total wrist vertebral bone mineral density is equal to 7.9 g/cm squared with a T score of -1.0.     Osteopenia Consider FDA approved medical therapies in postmenopausal women and men aged 50 years and older, based on the following: *A hip or vertebral (clinical or morphometric) fracture *T score less than or equal to -2.5 at the femoral neck or spine after appropriate evaluation to exclude secondary causes. *Low bone mass -- also known as osteopenia (T score between -1.0 and -2.5 at the femoral neck or spine) and a 10 year probability of hip fracture greater than or  equal to 3% or a 10 year probability of major osteoporosis-related fracture greater than or equal to 20% based on the [place]-adapted WHO algorithm. *Clinicians judgment and/or patient preference may indicate treatment for people with 10 year fracture probabilities is above or below these levels. Electronically signed by: Richard Scott MD Date:    02/04/2025 Time:    11:40      Assessment  1. Syncope, unspecified syncope type   No clear etiology based on her symptoms.  But likely orthostatic given the situation.  - Ambulatory referral/consult to Cardiology  - Cardiac event monitor; Future    2. Palpitations  Unclear that this is an arrhythmia.  Could have been sinus tachycardia preceding her loss of consciousness from hypotension.  - Cardiac event monitor; Future    3. Severe obesity  Encouraged to get back to regular exercise.      Plan and Discussion    Plan for an event monitor with further planning based on those results.    The 10-year ASCVD risk score (Mark KITCHEN, et al., 2019) is: 8.8%    Values used to calculate the score:      Age: 74 years      Sex: Female      Is Non- : No      Diabetic: No      Tobacco smoker: No      Systolic Blood Pressure: 112 mmHg      Is BP treated: No      HDL Cholesterol: 53 mg/dL      Total Cholesterol: 164 mg/dL     Follow Up  Follow up for test results.      Jonah Rojas MD         [1]   Current Outpatient Medications   Medication Sig Dispense Refill    albuterol (PROVENTIL/VENTOLIN HFA) 90 mcg/actuation inhaler Inhale 2 puffs into the lungs every 4 to 6 hours as needed for Wheezing. 18 g 5    atorvastatin (LIPITOR) 10 MG tablet Take 1 tablet (10 mg total) by mouth once daily. 90 tablet 1    citalopram (CELEXA) 20 MG tablet Take 1 tablet (20 mg total) by mouth once daily. 90 tablet 0    dexmethylphenidate (FOCALIN) 5 MG tablet Take 1 tablet (5 mg total) by mouth 2 (two) times daily. 60 tablet 0    fluticasone propionate (FLONASE) 50 mcg/actuation nasal  spray 2 sprays (100 mcg total) by Each Nostril route once daily. 15.8 mL 5    gabapentin (NEURONTIN) 100 MG capsule Take by mouth. 200mg in the morning and 300mg at night      levothyroxine (SYNTHROID) 125 MCG tablet TAKE 1 TABLET (125 MCG TOTAL) BY MOUTH BEFORE BREAKFAST. 90 tablet 0    omeprazole (PRILOSEC) 20 MG capsule Take 1 capsule (20 mg total) by mouth once daily. 90 capsule 3    TRELEGY ELLIPTA 200-62.5-25 mcg inhaler INHALE 1 PUFF INTO THE LUNGS ONCE DAILY. 180 each 3    benralizumab 30 mg/mL AtIn Inject 30 mg into the skin. Every other month (Patient not taking: Reported on 2/24/2025)      diclofenac sodium (VOLTAREN ARTHRITIS PAIN) 1 % Gel Apply 2 g topically once daily. (Patient not taking: Reported on 2/24/2025) 100 g 5    HYDROcodone-acetaminophen (NORCO) 5-325 mg per tablet Take 1 tablet by mouth every 8 (eight) hours as needed for Pain. (Patient not taking: Reported on 1/28/2025) 18 tablet 0    ondansetron (ZOFRAN-ODT) 4 MG TbDL Take 1 tablet (4 mg total) by mouth every 12 (twelve) hours as needed (Nausea or vomiting). (Patient not taking: Reported on 1/28/2025) 6 tablet 0     No current facility-administered medications for this visit.   [2]   Social History  Socioeconomic History    Marital status:    Tobacco Use    Smoking status: Never    Smokeless tobacco: Never   Substance and Sexual Activity    Alcohol use: Never    Drug use: Not Currently    Sexual activity: Not Currently     Partners: Male     Birth control/protection: Rhythm     Comment: Birth control pills     Social Drivers of Health     Financial Resource Strain: Patient Declined (2/24/2025)    Overall Financial Resource Strain (CARDIA)     Difficulty of Paying Living Expenses: Patient declined   Food Insecurity: No Food Insecurity (2/24/2025)    Hunger Vital Sign     Worried About Running Out of Food in the Last Year: Never true     Ran Out of Food in the Last Year: Never true   Transportation Needs: No Transportation Needs  (2/24/2025)    PRAPARE - Transportation     Lack of Transportation (Medical): No     Lack of Transportation (Non-Medical): No   Physical Activity: Inactive (2/24/2025)    Exercise Vital Sign     Days of Exercise per Week: 0 days     Minutes of Exercise per Session: 0 min   Stress: Stress Concern Present (2/24/2025)    [employer] - Occupational Stress Questionnaire     Feeling of Stress : To some extent   Housing Stability: Low Risk  (2/24/2025)    Housing Stability Vital Sign     Unable to Pay for Housing in the Last Year: No     Number of Times Moved in the Last Year: 0     Homeless in the Last Year: No

## 2025-02-25 ENCOUNTER — PATIENT MESSAGE (OUTPATIENT)
Dept: CARDIOLOGY | Facility: CLINIC | Age: 75
End: 2025-02-25
Payer: MEDICARE

## 2025-03-05 DIAGNOSIS — E03.9 HYPOTHYROIDISM, UNSPECIFIED TYPE: ICD-10-CM

## 2025-03-05 NOTE — TELEPHONE ENCOUNTER
Patient comment: I have 1 tablet left. Im sorry i waited so long to request a refill. Thank you.

## 2025-03-05 NOTE — TELEPHONE ENCOUNTER
No care due was identified.  [facility] Embedded Care Due Messages. Reference number: [id number].   3/05/2025 12:27:41 PM CST

## 2025-03-06 RX ORDER — LEVOTHYROXINE SODIUM 125 UG/1
125 TABLET ORAL
Qty: 90 TABLET | Refills: 0 | Status: SHIPPED | OUTPATIENT
Start: 2025-03-06

## 2025-03-06 NOTE — TELEPHONE ENCOUNTER
Refill Routing Note   Medication(s) are not appropriate for processing by [facility] for the following reason(s):        No active prescription written by provider  ED/Hospital Visit since last OV with provider    [facility] action(s):  Defer        Medication Therapy Plan: ED for syncope; Last med order under provider other than PCP      Appointments  past 12m or future 3m with PCP    Date Provider   Last Visit   10/23/2024 Hipolito Arboleda MD   Next Visit   5/12/2025 Hipolito Arboleda MD   ED visits in past 90 days: 0        Note composed:6:56 AM 03/06/2025

## 2025-03-11 DIAGNOSIS — F98.8 ATTENTION DEFICIT DISORDER, UNSPECIFIED TYPE: ICD-10-CM

## 2025-03-11 NOTE — TELEPHONE ENCOUNTER
No care due was identified.  [facility] Embedded Care Due Messages. Reference number: [id number].   3/11/2025 4:40:56 PM CDT

## 2025-03-12 RX ORDER — DEXMETHYLPHENIDATE HYDROCHLORIDE 5 MG/1
5 TABLET ORAL 2 TIMES DAILY
Qty: 60 TABLET | Refills: 0 | Status: SHIPPED | OUTPATIENT
Start: 2025-03-12

## 2025-03-24 DIAGNOSIS — Z00.00 ENCOUNTER FOR MEDICARE ANNUAL WELLNESS EXAM: ICD-10-CM

## 2025-04-07 ENCOUNTER — CLINICAL SUPPORT (OUTPATIENT)
Dept: CARDIOLOGY | Facility: HOSPITAL | Age: 75
End: 2025-04-07
Attending: INTERNAL MEDICINE
Payer: MEDICARE

## 2025-04-07 DIAGNOSIS — R55 SYNCOPE, UNSPECIFIED SYNCOPE TYPE: ICD-10-CM

## 2025-04-07 DIAGNOSIS — R00.2 PALPITATIONS: ICD-10-CM

## 2025-04-07 PROCEDURE — 93270 REMOTE 30 DAY ECG REV/REPORT: CPT

## 2025-04-08 DIAGNOSIS — J42 CHRONIC BRONCHITIS, UNSPECIFIED CHRONIC BRONCHITIS TYPE: ICD-10-CM

## 2025-04-08 RX ORDER — FLUTICASONE FUROATE, UMECLIDINIUM BROMIDE AND VILANTEROL TRIFENATATE 200; 62.5; 25 UG/1; UG/1; UG/1
1 POWDER RESPIRATORY (INHALATION) DAILY
Qty: 180 EACH | Refills: 4 | Status: SHIPPED | OUTPATIENT
Start: 2025-04-08

## 2025-04-08 NOTE — TELEPHONE ENCOUNTER
Care Due:                  Date            Visit Type   Department     Provider  --------------------------------------------------------------------------------                                MYCHART                              FOLLOWUP/OF  [facility]  Last Visit: 10-      FICE VISIT   PRIMARY CARE   Hipolito Arboleda                               -                              PRIMARY [facility]  Next Visit: 05-      CARE ([facility])   PRIMARY CARE   Hipolito Arboleda                                                            Last  Test          Frequency    Reason                     Performed    Due Date  --------------------------------------------------------------------------------    Lipid Panel.  12 months..  atorvastatin.............  09-   09-    [facility] Embedded Care Due Messages. Reference number: [id number].   4/08/2025 11:20:55 AM CDT

## 2025-04-14 DIAGNOSIS — F32.A DEPRESSION, UNSPECIFIED DEPRESSION TYPE: Primary | ICD-10-CM

## 2025-04-15 RX ORDER — CITALOPRAM 20 MG/1
20 TABLET, FILM COATED ORAL DAILY
Qty: 90 TABLET | Refills: 1 | Status: SHIPPED | OUTPATIENT
Start: 2025-04-15

## 2025-04-15 NOTE — TELEPHONE ENCOUNTER
Pt. Was last seen on 1/28/25 by Dr. Arboleda - has f/u scheduled for 5/12 please allow 30 day supply of meds.

## 2025-05-09 ENCOUNTER — RESULTS FOLLOW-UP (OUTPATIENT)
Dept: CARDIOLOGY | Facility: CLINIC | Age: 75
End: 2025-05-09

## 2025-05-12 ENCOUNTER — OFFICE VISIT (OUTPATIENT)
Dept: PRIMARY CARE CLINIC | Facility: CLINIC | Age: 75
End: 2025-05-12
Payer: MEDICARE

## 2025-05-12 VITALS
HEART RATE: 80 BPM | HEIGHT: 63 IN | OXYGEN SATURATION: 98 % | RESPIRATION RATE: 18 BRPM | DIASTOLIC BLOOD PRESSURE: 70 MMHG | BODY MASS INDEX: 45.49 KG/M2 | SYSTOLIC BLOOD PRESSURE: 132 MMHG | WEIGHT: 256.75 LBS

## 2025-05-12 DIAGNOSIS — L65.9 ALOPECIA: Primary | ICD-10-CM

## 2025-05-12 DIAGNOSIS — Z85.3 HISTORY OF BREAST CANCER: ICD-10-CM

## 2025-05-12 DIAGNOSIS — G47.00 INSOMNIA, UNSPECIFIED TYPE: ICD-10-CM

## 2025-05-12 DIAGNOSIS — K58.2 IRRITABLE BOWEL SYNDROME WITH BOTH CONSTIPATION AND DIARRHEA: ICD-10-CM

## 2025-05-12 PROCEDURE — 3078F DIAST BP <80 MM HG: CPT | Mod: CPTII,S$GLB,, | Performed by: STUDENT IN AN ORGANIZED HEALTH CARE EDUCATION/TRAINING PROGRAM

## 2025-05-12 PROCEDURE — 3008F BODY MASS INDEX DOCD: CPT | Mod: CPTII,S$GLB,, | Performed by: STUDENT IN AN ORGANIZED HEALTH CARE EDUCATION/TRAINING PROGRAM

## 2025-05-12 PROCEDURE — 3044F HG A1C LEVEL LT 7.0%: CPT | Mod: CPTII,S$GLB,, | Performed by: STUDENT IN AN ORGANIZED HEALTH CARE EDUCATION/TRAINING PROGRAM

## 2025-05-12 PROCEDURE — 99999 PR PBB SHADOW E&M-EST. PATIENT-LVL IV: CPT | Mod: PBBFAC,,, | Performed by: STUDENT IN AN ORGANIZED HEALTH CARE EDUCATION/TRAINING PROGRAM

## 2025-05-12 PROCEDURE — 1159F MED LIST DOCD IN RCRD: CPT | Mod: CPTII,S$GLB,, | Performed by: STUDENT IN AN ORGANIZED HEALTH CARE EDUCATION/TRAINING PROGRAM

## 2025-05-12 PROCEDURE — 99214 OFFICE O/P EST MOD 30 MIN: CPT | Mod: S$GLB,,, | Performed by: STUDENT IN AN ORGANIZED HEALTH CARE EDUCATION/TRAINING PROGRAM

## 2025-05-12 PROCEDURE — 3075F SYST BP GE 130 - 139MM HG: CPT | Mod: CPTII,S$GLB,, | Performed by: STUDENT IN AN ORGANIZED HEALTH CARE EDUCATION/TRAINING PROGRAM

## 2025-05-12 PROCEDURE — 1160F RVW MEDS BY RX/DR IN RCRD: CPT | Mod: CPTII,S$GLB,, | Performed by: STUDENT IN AN ORGANIZED HEALTH CARE EDUCATION/TRAINING PROGRAM

## 2025-05-12 RX ORDER — SUVOREXANT 5 MG/1
5 TABLET, FILM COATED ORAL NIGHTLY
Qty: 30 TABLET | Refills: 5 | Status: SHIPPED | OUTPATIENT
Start: 2025-05-12

## 2025-05-12 RX ORDER — MINOXIDIL 2 %
SOLUTION, NON-ORAL TOPICAL 2 TIMES DAILY
Qty: 60 ML | Refills: 5 | Status: SHIPPED | OUTPATIENT
Start: 2025-05-12

## 2025-05-12 RX ORDER — ALBUTEROL SULFATE AND BUDESONIDE 90; 80 UG/1; UG/1
2 AEROSOL, METERED RESPIRATORY (INHALATION) EVERY 4 HOURS PRN
COMMUNITY
Start: 2025-03-03

## 2025-05-12 NOTE — PROGRESS NOTES
Subjective:       Patient ID: Elizabeth Au is a 75 y.o. female.    Chief Complaint: Follow-up (3 month appt. )    HPI:  75 y.o. female presents to [facility] here for follow-up appointment    Acute concerns?: Had bone density and was told has osteoporosis. Was instructed to take calcium and Vit D.    Being followed for breast cancer with mastectomy and 36 lymph nodes dissected with some positive. Underwent chemo and radiation therapy and developed lymphedema in bilateral arms.    Still follows with breast clinic?: Follows with Oncologist yearly.  Active disease?: No    Had syncopal episode in [place] a couple months ago. Had heart palpitations and prodrome before event. Was in hospital for 3 days. Was given a 30 day monitor that was just returned. Has decreased caffeine.    Is not sleeping well. Going to bed 3:30-4am and sleeping later in day.    Difficulty falling asleep?: Yes  Difficulty staying asleep?: No  Bed time routine?: Does have same regimen. Plays game on TV prior.  Attributing concerns?: Mind is active  Caffeine, coffee, soda, tea?: Has decreased to once daily  Awakens to pee?: No  Snoring/Short of breath?: Does snore, no COY, was tested  Number of pillows?: 2  Lights in bed?: No  Phone/TV in bed?: No  Reading in bed?: No  Failed trials?:  10 mg melatonin    Review of Systems   Constitutional:  Positive for fatigue (Feels with insomnia). Negative for chills, diaphoresis and fever.   Respiratory:  Negative for chest tightness and shortness of breath.    Cardiovascular:  Positive for palpitations (When active, feels like she is out of shape. Recent 30 day monitor that was not concerning). Negative for chest pain.   Gastrointestinal:  Positive for constipation and diarrhea. Negative for abdominal pain, nausea and vomiting.        IBS   Skin:  Negative for rash and wound.   Neurological:  Positive for numbness (Bilateral hand and foot neuropathy). Negative for weakness.       Objective:      Vitals:     "05/12/25 1449   BP: 132/70   BP Location: Right arm   Patient Position: Sitting   Pulse: 80   Resp: 18   SpO2: 98%   Weight: 116.4 kg (256 lb 11.6 oz)   Height: 5' 3" (1.6 m)     Physical Exam  Vitals reviewed.   Constitutional:       General: She is not in acute distress.     Appearance: Normal appearance. She is not ill-appearing.   HENT:      Head: Normocephalic and atraumatic.   Eyes:      General:         Right eye: No discharge.         Left eye: No discharge.      Conjunctiva/sclera: Conjunctivae normal.   Cardiovascular:      Rate and Rhythm: Normal rate and regular rhythm.      Pulses: Normal pulses.      Heart sounds: No murmur heard.  Pulmonary:      Effort: Pulmonary effort is normal.      Breath sounds: Normal breath sounds.   Musculoskeletal:         General: No deformity.      Cervical back: Neck supple. No rigidity.   Lymphadenopathy:      Cervical: No cervical adenopathy.   Skin:     General: Skin is warm and dry.      Coloration: Skin is not jaundiced.   Neurological:      General: No focal deficit present.      Mental Status: She is alert and oriented to person, place, and time.   Psychiatric:         Mood and Affect: Mood normal.         Behavior: Behavior normal.             Lab Results   Component Value Date     01/07/2025    K 4.2 01/07/2025     01/07/2025    CO2 21 (L) 01/07/2025    BUN 15 01/07/2025    CREATININE 0.8 01/07/2025    ANIONGAP 11 01/07/2025     Lab Results   Component Value Date    HGBA1C 5.1 01/05/2025     Lab Results   Component Value Date    BNP 36 01/05/2025       Lab Results   Component Value Date    WBC 8.04 01/07/2025    HGB 14.1 01/07/2025    HCT 43.4 01/07/2025     01/07/2025    GRAN 4.3 01/07/2025    GRAN 53.5 01/07/2025     Lab Results   Component Value Date    CHOL 164 09/21/2023    HDL 53 09/21/2023    LDLCALC 94.4 09/21/2023    TRIG 83 09/21/2023        Current Medications[1]        Assessment:       1. Alopecia    2. History of breast cancer  "   3. Insomnia, unspecified type           Plan:       Alopecia  -     minoxidiL (ROGAINE) 2 % external solution; Apply topically 2 (two) times daily.  Dispense: 60 mL; Refill: 5    History of breast cancer  - Keep annual follow-up with Oncology    Insomnia, unspecified type  -     suvorexant (BELSOMRA) 5 mg Tab; Take 5 mg by mouth every evening.  Dispense: 30 tablet; Refill: 5  - [facility]  [facility] recommended  - Sleep tips provided today's visit    IBS  - Recommend probiotic and daily fiber. Can consider anti-inflammatory diet in future    RTC in 6 months         [1]   Current Outpatient Medications:     AIRSUPRA 90-80 mcg/actuation, Inhale 2 puffs into the lungs every 4 (four) hours as needed., Disp: , Rfl:     albuterol (PROVENTIL/VENTOLIN HFA) 90 mcg/actuation inhaler, Inhale 2 puffs into the lungs every 4 to 6 hours as needed for Wheezing., Disp: 18 g, Rfl: 5    atorvastatin (LIPITOR) 10 MG tablet, Take 1 tablet (10 mg total) by mouth once daily., Disp: 90 tablet, Rfl: 1    benralizumab 30 mg/mL AtIn, Inject 30 mg into the skin. Every other month, Disp: , Rfl:     citalopram (CELEXA) 20 MG tablet, Take 1 tablet (20 mg total) by mouth once daily., Disp: 90 tablet, Rfl: 1    dexmethylphenidate (FOCALIN) 5 MG tablet, Take 1 tablet (5 mg total) by mouth 2 (two) times daily., Disp: 60 tablet, Rfl: 0    diclofenac sodium (VOLTAREN ARTHRITIS PAIN) 1 % Gel, Apply 2 g topically once daily., Disp: 100 g, Rfl: 5    fluticasone propionate (FLONASE) 50 mcg/actuation nasal spray, 2 sprays (100 mcg total) by Each Nostril route once daily., Disp: 15.8 mL, Rfl: 5    gabapentin (NEURONTIN) 100 MG capsule, Take by mouth. 200mg in the morning and 300mg at night, Disp: , Rfl:     levothyroxine (SYNTHROID) 125 MCG tablet, Take 1 tablet (125 mcg total) by mouth before breakfast., Disp: 90 tablet, Rfl: 0    omeprazole (PRILOSEC) 20 MG capsule, Take 1 capsule (20 mg total) by mouth once daily., Disp: 90  capsule, Rfl: 3    TRELEGY ELLIPTA 200-62.5-25 mcg inhaler, INHALE 1 PUFF INTO THE LUNGS ONCE DAILY., Disp: 180 each, Rfl: 4    minoxidiL (ROGAINE) 2 % external solution, Apply topically 2 (two) times daily., Disp: 60 mL, Rfl: 5    suvorexant (BELSOMRA) 5 mg Tab, Take 5 mg by mouth every evening., Disp: 30 tablet, Rfl: 5

## 2025-05-24 DIAGNOSIS — G47.00 INSOMNIA, UNSPECIFIED TYPE: ICD-10-CM

## 2025-05-24 NOTE — TELEPHONE ENCOUNTER
No care due was identified.  [facility] Embedded Care Due Messages. Reference number: [id number].   5/24/2025 2:39:22 AM CDT

## 2025-05-27 DIAGNOSIS — J42 CHRONIC BRONCHITIS, UNSPECIFIED CHRONIC BRONCHITIS TYPE: ICD-10-CM

## 2025-05-27 RX ORDER — SUVOREXANT 5 MG/1
5 TABLET, FILM COATED ORAL NIGHTLY
Qty: 30 TABLET | Refills: 5 | Status: SHIPPED | OUTPATIENT
Start: 2025-05-27

## 2025-05-28 RX ORDER — ALBUTEROL SULFATE 90 UG/1
2 INHALANT RESPIRATORY (INHALATION)
Qty: 18 G | Refills: 5 | Status: SHIPPED | OUTPATIENT
Start: 2025-05-28

## 2025-05-28 NOTE — TELEPHONE ENCOUNTER
No care due was identified.  [facility] Embedded Care Due Messages. Reference number: [id number].   5/27/2025 7:00:10 PM CDT

## 2025-07-08 ENCOUNTER — TELEPHONE (OUTPATIENT)
Dept: PRIMARY CARE CLINIC | Facility: CLINIC | Age: 75
End: 2025-07-08
Payer: MEDICARE

## 2025-07-08 NOTE — TELEPHONE ENCOUNTER
Copied from CRM #[id number]. Topic: General Inquiry - Patient Advice  >> Jul 8, 2025  4:47 PM Robert wrote:  Type: Returning a call    Who left a message? Patient     When did the practice call?    Does patient know what this is regarding:    Who called and best call back number: Patient [phone]    Would the patient rather a call back or a response via [facility]? Call back     Comments:Pt would like a call back to discuss if the office can tell her what blood type she is. Please advise pt

## 2025-07-21 DIAGNOSIS — F98.8 ATTENTION DEFICIT DISORDER, UNSPECIFIED TYPE: ICD-10-CM

## 2025-07-21 DIAGNOSIS — F32.A DEPRESSION, UNSPECIFIED DEPRESSION TYPE: ICD-10-CM

## 2025-07-21 RX ORDER — CITALOPRAM 20 MG/1
20 TABLET ORAL DAILY
Qty: 90 TABLET | Refills: 1 | Status: SHIPPED | OUTPATIENT
Start: 2025-07-21

## 2025-07-21 RX ORDER — DEXMETHYLPHENIDATE HYDROCHLORIDE 5 MG/1
5 TABLET ORAL 2 TIMES DAILY
Qty: 60 TABLET | Refills: 0 | Status: SHIPPED | OUTPATIENT
Start: 2025-07-21

## 2025-07-21 NOTE — TELEPHONE ENCOUNTER
Care Due:                  Date            Visit Type   Department     Provider  --------------------------------------------------------------------------------                                 -                              PRIMARY [facility]  Last Visit: 05-      CARE ([facility])   PRIMARY CARE   Hipolito Arboleda                              [place] [facility]  Next Visit: 11-      CARE ([facility])   PRIMARY CARE   Hipolito Arboleda                                                            Last  Test          Frequency    Reason                     Performed    Due Date  --------------------------------------------------------------------------------    Lipid Panel.  12 months..  atorvastatin.............  09-   09-    [facility] Embedded Care Due Messages. Reference number: [id number].   7/21/2025 10:55:16 AM CDT

## 2025-07-21 NOTE — TELEPHONE ENCOUNTER
Refill Routing Note   Medication(s) are not appropriate for processing by [facility] for the following reason(s):        Outside of protocol    [facility] action(s):  Route   Requires labs : Yes             Appointments  past 12m or future 3m with PCP    Date Provider   Last Visit   5/12/2025 Hipolito Arboleda MD   Next Visit   11/12/2025 Hipolito Arboleda MD   ED visits in past 90 days: 0        Note composed:12:15 PM 07/21/2025

## 2025-08-13 DIAGNOSIS — G47.00 INSOMNIA, UNSPECIFIED TYPE: ICD-10-CM

## 2025-08-13 RX ORDER — SUVOREXANT 5 MG/1
5 TABLET, FILM COATED ORAL NIGHTLY
Qty: 30 TABLET | Refills: 2 | Status: SHIPPED | OUTPATIENT
Start: 2025-08-13

## 2025-08-26 RX ORDER — ATORVASTATIN CALCIUM 10 MG/1
10 TABLET, FILM COATED ORAL DAILY
Qty: 90 TABLET | Refills: 0 | Status: SHIPPED | OUTPATIENT
Start: 2025-08-26

## 2025-08-27 ENCOUNTER — TELEPHONE (OUTPATIENT)
Dept: PRIMARY CARE CLINIC | Facility: CLINIC | Age: 75
End: 2025-08-27
Payer: MEDICARE

## 2025-08-29 ENCOUNTER — OFFICE VISIT (OUTPATIENT)
Dept: PRIMARY CARE CLINIC | Facility: CLINIC | Age: 75
End: 2025-08-29
Payer: MEDICARE

## 2025-08-29 VITALS
DIASTOLIC BLOOD PRESSURE: 70 MMHG | OXYGEN SATURATION: 95 % | RESPIRATION RATE: 18 BRPM | HEART RATE: 91 BPM | BODY MASS INDEX: 46.41 KG/M2 | HEIGHT: 63 IN | WEIGHT: 261.94 LBS | SYSTOLIC BLOOD PRESSURE: 128 MMHG | TEMPERATURE: 98 F

## 2025-08-29 DIAGNOSIS — N30.00 ACUTE CYSTITIS WITHOUT HEMATURIA: Primary | ICD-10-CM

## 2025-08-29 LAB
BACTERIA #/AREA URNS AUTO: ABNORMAL /HPF
BILIRUB SERPL-MCNC: NEGATIVE MG/DL
BILIRUB UR QL STRIP.AUTO: NEGATIVE
BLOOD URINE, POC: NEGATIVE
CLARITY UR: CLEAR
CLARITY, POC UA: CLEAR
COLOR UR AUTO: YELLOW
COLOR, POC UA: YELLOW
GLUCOSE UR QL STRIP: NEGATIVE
GLUCOSE UR QL STRIP: NEGATIVE
HGB UR QL STRIP: NEGATIVE
KETONES UR QL STRIP: NEGATIVE
KETONES UR QL STRIP: NEGATIVE
LEUKOCYTE ESTERASE UR QL STRIP: ABNORMAL
LEUKOCYTE ESTERASE URINE, POC: NORMAL
MICROSCOPIC COMMENT: ABNORMAL
NITRITE UR QL STRIP: NEGATIVE
NITRITE, POC UA: NEGATIVE
PH UR STRIP: 7 [PH]
PH, POC UA: 6
PROT UR QL STRIP: NEGATIVE
PROTEIN, POC: NORMAL
RBC #/AREA URNS AUTO: 1 /HPF (ref 0–4)
SP GR UR STRIP: 1.02
SPECIFIC GRAVITY, POC UA: 1.01
SQUAMOUS #/AREA URNS AUTO: 1 /HPF
UROBILINOGEN UR STRIP-ACNC: NEGATIVE EU/DL
UROBILINOGEN, POC UA: NORMAL
WBC #/AREA URNS AUTO: 33 /HPF (ref 0–5)

## 2025-08-29 PROCEDURE — 99999 PR PBB SHADOW E&M-EST. PATIENT-LVL V: CPT | Mod: PBBFAC,,,

## 2025-08-29 RX ORDER — PHENAZOPYRIDINE HYDROCHLORIDE 200 MG/1
200 TABLET, FILM COATED ORAL 3 TIMES DAILY
Qty: 6 TABLET | Refills: 0 | Status: SHIPPED | OUTPATIENT
Start: 2025-08-29 | End: 2025-08-31

## 2025-08-29 RX ORDER — NITROFURANTOIN 25; 75 MG/1; MG/1
100 CAPSULE ORAL 2 TIMES DAILY
Qty: 10 CAPSULE | Refills: 0 | Status: SHIPPED | OUTPATIENT
Start: 2025-08-29 | End: 2025-09-03